# Patient Record
Sex: MALE | Race: WHITE | NOT HISPANIC OR LATINO | Employment: FULL TIME | URBAN - METROPOLITAN AREA
[De-identification: names, ages, dates, MRNs, and addresses within clinical notes are randomized per-mention and may not be internally consistent; named-entity substitution may affect disease eponyms.]

---

## 2018-03-08 ENCOUNTER — HISTORICAL (OUTPATIENT)
Dept: ADMINISTRATIVE | Facility: HOSPITAL | Age: 68
End: 2018-03-08

## 2018-04-19 ENCOUNTER — HISTORICAL (OUTPATIENT)
Dept: ADMINISTRATIVE | Facility: HOSPITAL | Age: 68
End: 2018-04-19

## 2019-04-07 ENCOUNTER — HOSPITAL ENCOUNTER (OUTPATIENT)
Dept: ONCOLOGY | Facility: HOSPITAL | Age: 69
End: 2019-04-09
Attending: COLON & RECTAL SURGERY | Admitting: COLON & RECTAL SURGERY

## 2019-04-07 LAB
ABS NEUT (OLG): 7.77 X10(3)/MCL (ref 2.1–9.2)
ALBUMIN SERPL-MCNC: 3.3 GM/DL (ref 3.4–5)
ALBUMIN/GLOB SERPL: 1 RATIO (ref 1.1–2)
ALP SERPL-CCNC: 79 UNIT/L (ref 50–136)
ALT SERPL-CCNC: 85 UNIT/L (ref 12–78)
AMPHET UR QL SCN: NEGATIVE
AMYLASE SERPL-CCNC: 58 UNIT/L (ref 25–115)
APPEARANCE, UA: CLEAR
APTT PPP: 25.8 SECOND(S) (ref 24.2–33.9)
AST SERPL-CCNC: 167 UNIT/L (ref 15–37)
BACTERIA SPEC CULT: NORMAL /HPF
BARBITURATE SCN PRESENT UR: NEGATIVE
BASOPHILS # BLD AUTO: 0.2 X10(3)/MCL (ref 0–0.2)
BASOPHILS NFR BLD AUTO: 1 %
BENZODIAZ UR QL SCN: NEGATIVE
BILIRUB SERPL-MCNC: 0.3 MG/DL (ref 0.2–1)
BILIRUB UR QL STRIP: NEGATIVE
BILIRUBIN DIRECT+TOT PNL SERPL-MCNC: 0.1 MG/DL (ref 0–0.5)
BILIRUBIN DIRECT+TOT PNL SERPL-MCNC: 0.2 MG/DL (ref 0–0.8)
BUN SERPL-MCNC: 21 MG/DL (ref 7–18)
CALCIUM SERPL-MCNC: 8.4 MG/DL (ref 8.5–10.1)
CANNABINOIDS UR QL SCN: NEGATIVE
CHLORIDE SERPL-SCNC: 104 MMOL/L (ref 98–107)
CO2 SERPL-SCNC: 27 MMOL/L (ref 21–32)
COCAINE UR QL SCN: NEGATIVE
COLOR UR: YELLOW
CREAT SERPL-MCNC: 1.26 MG/DL (ref 0.7–1.3)
CROSSMATCH INTERPRETATION: NORMAL
EOSINOPHIL # BLD AUTO: 0.4 X10(3)/MCL (ref 0–0.9)
EOSINOPHIL NFR BLD AUTO: 3 %
ERYTHROCYTE [DISTWIDTH] IN BLOOD BY AUTOMATED COUNT: 14.1 % (ref 11.5–17)
ETHANOL SERPL-MCNC: 5 MG/DL (ref 0–3)
GLOBULIN SER-MCNC: 3.3 GM/DL (ref 2.4–3.5)
GLUCOSE (UA): NEGATIVE
GLUCOSE SERPL-MCNC: 130 MG/DL (ref 74–106)
GROUP & RH: NORMAL
HCT VFR BLD AUTO: 39.2 % (ref 42–52)
HGB BLD-MCNC: 12.2 GM/DL (ref 14–18)
HGB UR QL STRIP: NEGATIVE
INR PPP: 1.2 (ref 0–1.3)
KETONES UR QL STRIP: NEGATIVE
LACTATE SERPL-SCNC: 1 MMOL/L (ref 0.4–2)
LACTATE SERPL-SCNC: 2.8 MMOL/L (ref 0.4–2)
LEUKOCYTE ESTERASE UR QL STRIP: NEGATIVE
LIPASE SERPL-CCNC: 125 UNIT/L (ref 73–393)
LYMPHOCYTES # BLD AUTO: 4.6 X10(3)/MCL (ref 0.6–4.6)
LYMPHOCYTES NFR BLD AUTO: 33 %
MCH RBC QN AUTO: 29 PG (ref 27–31)
MCHC RBC AUTO-ENTMCNC: 31.1 GM/DL (ref 33–36)
MCV RBC AUTO: 93.3 FL (ref 80–94)
MONOCYTES # BLD AUTO: 1 X10(3)/MCL (ref 0.1–1.3)
MONOCYTES NFR BLD AUTO: 7 %
NEUTROPHILS # BLD AUTO: 7.77 X10(3)/MCL (ref 2.1–9.2)
NEUTROPHILS NFR BLD AUTO: 56 %
NITRITE UR QL STRIP: NEGATIVE
OPIATES UR QL SCN: POSITIVE
PCP UR QL: NEGATIVE
PH UR STRIP.AUTO: 5 [PH] (ref 5–7.5)
PH UR STRIP: 5 [PH] (ref 5–9)
PLATELET # BLD AUTO: 307 X10(3)/MCL (ref 130–400)
PMV BLD AUTO: 10.2 FL (ref 9.4–12.4)
POC TROPONIN: 0 NG/ML (ref 0–0.08)
POTASSIUM SERPL-SCNC: 3.4 MMOL/L (ref 3.5–5.1)
PRODUCT READY: NORMAL
PROT SERPL-MCNC: 6.6 GM/DL (ref 6.4–8.2)
PROT UR QL STRIP: NEGATIVE
PROTHROMBIN TIME: 15.1 SECOND(S) (ref 12–14)
RBC # BLD AUTO: 4.2 X10(6)/MCL (ref 4.7–6.1)
RBC #/AREA URNS HPF: NORMAL /[HPF]
SODIUM SERPL-SCNC: 137 MMOL/L (ref 136–145)
SP GR FLD REFRACTOMETRY: 1.03 (ref 1–1.03)
SP GR UR STRIP: 1.03 (ref 1–1.03)
SQUAMOUS EPITHELIAL, UA: NORMAL
UROBILINOGEN UR STRIP-ACNC: 0.2
WBC # SPEC AUTO: 13.9 X10(3)/MCL (ref 4.5–11.5)
WBC #/AREA URNS HPF: NORMAL /HPF

## 2019-04-08 LAB
ABS NEUT (OLG): 7.49 X10(3)/MCL (ref 2.1–9.2)
ALBUMIN SERPL-MCNC: 2.9 GM/DL (ref 3.4–5)
ALBUMIN/GLOB SERPL: 1.1 RATIO (ref 1.1–2)
ALP SERPL-CCNC: 67 UNIT/L (ref 50–136)
ALT SERPL-CCNC: 66 UNIT/L (ref 12–78)
AST SERPL-CCNC: 85 UNIT/L (ref 15–37)
BASOPHILS # BLD AUTO: 0.1 X10(3)/MCL (ref 0–0.2)
BASOPHILS NFR BLD AUTO: 1 %
BILIRUB SERPL-MCNC: 0.7 MG/DL (ref 0.2–1)
BILIRUBIN DIRECT+TOT PNL SERPL-MCNC: 0.1 MG/DL (ref 0–0.5)
BILIRUBIN DIRECT+TOT PNL SERPL-MCNC: 0.6 MG/DL (ref 0–0.8)
BUN SERPL-MCNC: 16 MG/DL (ref 7–18)
CALCIUM SERPL-MCNC: 8.1 MG/DL (ref 8.5–10.1)
CHLORIDE SERPL-SCNC: 107 MMOL/L (ref 98–107)
CO2 SERPL-SCNC: 25 MMOL/L (ref 21–32)
CREAT SERPL-MCNC: 0.77 MG/DL (ref 0.7–1.3)
EOSINOPHIL # BLD AUTO: 0.1 X10(3)/MCL (ref 0–0.9)
EOSINOPHIL NFR BLD AUTO: 1 %
ERYTHROCYTE [DISTWIDTH] IN BLOOD BY AUTOMATED COUNT: 14.3 % (ref 11.5–17)
GLOBULIN SER-MCNC: 2.6 GM/DL (ref 2.4–3.5)
GLUCOSE SERPL-MCNC: 79 MG/DL (ref 74–106)
HCT VFR BLD AUTO: 37.3 % (ref 42–52)
HGB BLD-MCNC: 11.5 GM/DL (ref 14–18)
LYMPHOCYTES # BLD AUTO: 1.8 X10(3)/MCL (ref 0.6–4.6)
LYMPHOCYTES NFR BLD AUTO: 17 %
MCH RBC QN AUTO: 29 PG (ref 27–31)
MCHC RBC AUTO-ENTMCNC: 30.8 GM/DL (ref 33–36)
MCV RBC AUTO: 94 FL (ref 80–94)
MONOCYTES # BLD AUTO: 1.1 X10(3)/MCL (ref 0.1–1.3)
MONOCYTES NFR BLD AUTO: 10 %
NEUTROPHILS # BLD AUTO: 7.49 X10(3)/MCL (ref 2.1–9.2)
NEUTROPHILS NFR BLD AUTO: 71 %
PLATELET # BLD AUTO: 247 X10(3)/MCL (ref 130–400)
PMV BLD AUTO: 10.6 FL (ref 9.4–12.4)
POTASSIUM SERPL-SCNC: 4.6 MMOL/L (ref 3.5–5.1)
PROT SERPL-MCNC: 5.5 GM/DL (ref 6.4–8.2)
RBC # BLD AUTO: 3.97 X10(6)/MCL (ref 4.7–6.1)
SODIUM SERPL-SCNC: 138 MMOL/L (ref 136–145)
WBC # SPEC AUTO: 10.6 X10(3)/MCL (ref 4.5–11.5)

## 2019-04-09 LAB
ABS NEUT (OLG): 9.36 X10(3)/MCL (ref 2.1–9.2)
ALBUMIN SERPL-MCNC: 2.8 GM/DL (ref 3.4–5)
ALBUMIN/GLOB SERPL: 1.1 RATIO (ref 1.1–2)
ALP SERPL-CCNC: 81 UNIT/L (ref 50–136)
ALT SERPL-CCNC: 57 UNIT/L (ref 12–78)
AST SERPL-CCNC: 82 UNIT/L (ref 15–37)
BASOPHILS # BLD AUTO: 0.1 X10(3)/MCL (ref 0–0.2)
BASOPHILS NFR BLD AUTO: 0 %
BILIRUB SERPL-MCNC: 0.5 MG/DL (ref 0.2–1)
BILIRUBIN DIRECT+TOT PNL SERPL-MCNC: 0.2 MG/DL (ref 0–0.5)
BILIRUBIN DIRECT+TOT PNL SERPL-MCNC: 0.3 MG/DL (ref 0–0.8)
BUN SERPL-MCNC: 21 MG/DL (ref 7–18)
CALCIUM SERPL-MCNC: 8 MG/DL (ref 8.5–10.1)
CHLORIDE SERPL-SCNC: 108 MMOL/L (ref 98–107)
CO2 SERPL-SCNC: 28 MMOL/L (ref 21–32)
CREAT SERPL-MCNC: 0.98 MG/DL (ref 0.7–1.3)
EOSINOPHIL # BLD AUTO: 0.1 X10(3)/MCL (ref 0–0.9)
EOSINOPHIL NFR BLD AUTO: 1 %
ERYTHROCYTE [DISTWIDTH] IN BLOOD BY AUTOMATED COUNT: 14.5 % (ref 11.5–17)
GLOBULIN SER-MCNC: 2.6 GM/DL (ref 2.4–3.5)
GLUCOSE SERPL-MCNC: 112 MG/DL (ref 74–106)
HCT VFR BLD AUTO: 35.6 % (ref 42–52)
HGB BLD-MCNC: 10.9 GM/DL (ref 14–18)
LYMPHOCYTES # BLD AUTO: 2.3 X10(3)/MCL (ref 0.6–4.6)
LYMPHOCYTES NFR BLD AUTO: 17 %
MCH RBC QN AUTO: 28.8 PG (ref 27–31)
MCHC RBC AUTO-ENTMCNC: 30.6 GM/DL (ref 33–36)
MCV RBC AUTO: 94.2 FL (ref 80–94)
MONOCYTES # BLD AUTO: 1.2 X10(3)/MCL (ref 0.1–1.3)
MONOCYTES NFR BLD AUTO: 9 %
NEUTROPHILS # BLD AUTO: 9.36 X10(3)/MCL (ref 2.1–9.2)
NEUTROPHILS NFR BLD AUTO: 71 %
PLATELET # BLD AUTO: 230 X10(3)/MCL (ref 130–400)
PMV BLD AUTO: 10.2 FL (ref 9.4–12.4)
POTASSIUM SERPL-SCNC: 4.4 MMOL/L (ref 3.5–5.1)
PROT SERPL-MCNC: 5.4 GM/DL (ref 6.4–8.2)
RBC # BLD AUTO: 3.78 X10(6)/MCL (ref 4.7–6.1)
SODIUM SERPL-SCNC: 141 MMOL/L (ref 136–145)
WBC # SPEC AUTO: 13.1 X10(3)/MCL (ref 4.5–11.5)

## 2019-04-25 ENCOUNTER — HISTORICAL (OUTPATIENT)
Dept: ADMINISTRATIVE | Facility: HOSPITAL | Age: 69
End: 2019-04-25

## 2019-05-14 ENCOUNTER — HISTORICAL (OUTPATIENT)
Dept: ADMINISTRATIVE | Facility: HOSPITAL | Age: 69
End: 2019-05-14

## 2019-05-28 ENCOUNTER — HISTORICAL (OUTPATIENT)
Dept: ADMINISTRATIVE | Facility: HOSPITAL | Age: 69
End: 2019-05-28

## 2019-07-02 ENCOUNTER — HISTORICAL (OUTPATIENT)
Dept: ADMINISTRATIVE | Facility: HOSPITAL | Age: 69
End: 2019-07-02

## 2019-07-10 ENCOUNTER — HISTORICAL (OUTPATIENT)
Dept: ADMINISTRATIVE | Facility: HOSPITAL | Age: 69
End: 2019-07-10

## 2022-04-10 ENCOUNTER — HISTORICAL (OUTPATIENT)
Dept: ADMINISTRATIVE | Facility: HOSPITAL | Age: 72
End: 2022-04-10

## 2022-04-30 VITALS
WEIGHT: 185.19 LBS | BODY MASS INDEX: 22.53 KG/M2 | HEIGHT: 73 IN | WEIGHT: 185.19 LBS | SYSTOLIC BLOOD PRESSURE: 117 MMHG | WEIGHT: 170 LBS | HEIGHT: 73 IN | OXYGEN SATURATION: 97 % | BODY MASS INDEX: 24.54 KG/M2 | DIASTOLIC BLOOD PRESSURE: 73 MMHG | HEIGHT: 73 IN | BODY MASS INDEX: 24.54 KG/M2 | BODY MASS INDEX: 24.54 KG/M2 | DIASTOLIC BLOOD PRESSURE: 69 MMHG | BODY MASS INDEX: 24.54 KG/M2 | HEIGHT: 73 IN | DIASTOLIC BLOOD PRESSURE: 69 MMHG | WEIGHT: 185.19 LBS | SYSTOLIC BLOOD PRESSURE: 117 MMHG | DIASTOLIC BLOOD PRESSURE: 69 MMHG | SYSTOLIC BLOOD PRESSURE: 117 MMHG | SYSTOLIC BLOOD PRESSURE: 117 MMHG | HEIGHT: 73 IN | SYSTOLIC BLOOD PRESSURE: 123 MMHG | WEIGHT: 185.19 LBS | DIASTOLIC BLOOD PRESSURE: 69 MMHG

## 2022-04-30 NOTE — OP NOTE
Patient:   Randy Gutierrez            MRN: [id number]            FIN: [id number]               Age:   68 years     Sex:  Male     :  1950   Associated Diagnoses:   None   Author:   Domenic Monreal II, MD      Pre-op Dx:  Cataract of the Right eye    Post-op Dx:  Cataract of the Right eye     Procedure:  Cataract extraction by phacoemulsification   with an IOL    Anes:   Topical    Complications: None    Procedure in detail:   Dilating drops were given in the holding area.  The patient was brought into the surgical suite, identified and the correct eye confirmed.  Topical anesthesia was applied.  The eye was then prepped and draped in a sterile fashion.  A supersharp blade was used to make a paracentesis at the 11 oclock position.  Viscoelastic was placed in the anterior chamber.  A clear corneal incision was made at the 9 oclock position with a keratome blade.  Next, a cystotome and utrata forceps were used to make a 360 degree capsulorrhexis.  The phaco handpiece was placed into the anterior chamber and the lens removed in a divide and conquer fashion.  The remaining cortex was removed with the I & A handpiece.  Viscoelastic was placed into the capsular bag, which remained clear and intact.  An  IOL was placed in the bag and rotated into position.  The remaining viscoelastic was removed with the I &A handpiece.  The incision was hydrated with BSS and checked for leakage.  No leakage was found.  The drapes were removed and antibiotic drops were placed into the eye.  The patient tolerated the procedure well and was moved back to the holding room.  Sunglasses and instructions were personally given to the patient and family.  The patient will follow-up at my office tomorrow.     EFX 55    21.0    ZCBOO iol        Samuel Monreal II, M.D.

## 2022-04-30 NOTE — OP NOTE
Patient:   Randy Gutierrez            MRN: [id number]            FIN: [id number]               Age:   68 years     Sex:  Male     :  1950   Associated Diagnoses:   None   Author:   Domenic Monreal II, MD      Pre-op Dx:  Cataract of the Left eye    Post-op Dx:  Cataract of the Left eye     Procedure:  Cataract extraction by phacoemulsification   with an IOL    Anes:   Topical    Complications: None    Procedure in detail:   Dilating drops were given in the holding area.  The patient was brought into the surgical suite, identified and the correct eye confirmed.  Topical anesthesia was applied.  The eye was then prepped and draped in a sterile fashion.  A supersharp blade was used to make a paracentesis at the 5 oclock position.  Viscoelastic was placed in the anterior chamber.  A clear corneal incision was made at the   3 oclock position with a keratome blade.  Next, a cystatome and utrata forceps were used to make and remove a 360 degree capsulorrhexis.  The phaco handpiece was placed into the anterior chamber and the lens removed in a divide and conquer fashion.  The remaining cortex was removed with the I & A handpiece.  Viscoelastic was placed into the capsular bag, which remained clear and intact.  An  IOL was placed in the bag and rotated into position.  The remaining viscoelastic was removed with the I &A handpiece.  The incision was hydrated with BSS and checked for leakage.  No leakage was found.  The drapes were removed and antibiotic drops were placed into the eye.  The patient tolerated the procedure well and was moved back to the holding room.  Sunglasses and instructions were personally given to the patient and family.  The patient will follow-up at my office tomorrow.      EFX 50      21.5  ZCBOO IOL        Samuel Monreal II, M.D.

## 2022-04-30 NOTE — OP NOTE
DATE OF SURGERY:    04/08/2019    SURGEON:  Bradford Nunez MD  ASSISTANT:  Dougie Gonzales MD    PREOPERATIVE DIAGNOSIS:  Bilateral distal radius fractures.    POSTOPERATIVE DIAGNOSIS:  Bilateral distal radius fractures.    PROCEDURES:  Open reduction and internal fixation bilateral distal radius fractures, external fixation of the right distal radius    ASSISTANT:  Dougie Gonzales MD.  A certified assistant with skilled set of hands was necessary for proper position as well as assistance with closure.    ESTIMATED BLOOD LOSS:  30 cc.    IMPLANTS:  Include DVR Crosslock standard plate to the right as well as standard plate to the left, three 2.7 cortical screws, seven 2.7 locking screws.  The left had 2.7 cortical screws x4, 2.7 locking screws x6.  We also had a [employer] external fixator to the right wrist.    INDICATION FOR SURGERY:  The patient is a 69-year-old male who presented to the ER after a fall off a ladder.  The patient was noted to have bilateral distal radius fractures with significant comminution of the right fracture.  Left fracture was fairly nondisplaced, although intra-articular.  Risks, benefits and alternatives to operative intervention were discussed in detail with the patient.  All questions were answered.  No guarantees were made.  Despite these risks, he elected to proceed with surgical intervention.    PROCEDURE IN DETAIL:  The patient was identified in the preoperative holding area, marked and consented for surgery, taken to the operating room and placed under general endotracheal anesthesia.  Right arm was prepped and draped in normal sterile fashion.  Timeout was performed verifying correct patient, site, side and procedure.  Standard volar approach to the wrist was performed centered over the FCR tendon.  FCR tendon sheath was incised.  Tendon sheath was taken ulnarly.  FPL muscle fascia was incised as well and bluntly dissected through exposing the pronator quadratus.   The pronator quadratus was removed off the distal radius in a hockey-stick type fashion exposing the fracture.  The fracture fragment here was 3 parts with a very distal segment as well as a dorsal piece.  The fracture was reduced, pinned into place.  Appropriate placement of the plate was performed.  We placed 1 screw proximally to suck the plate down to the bone.  We then placed one screw distally, again to suck the distal fracture to the bone while holding the plate reduced.  We then were able to fill up the locking screws in the proximal row.  We checked these and all these were extra-articular.  We filled up the remaining locking screws in the distal row and all these were extra-articular.  We then put 2 lockers in the radial styloid portion.  Again, appropriate position was noted.  We then put 2 more screws in the shaft.  I examined the wrist given the dorsal fracture.  We did stress this wrist at rest.  The wrist remained anatomic, however, with any stress, the wrist did want to sublux dorsally.  Given this, we wanted to give some stability to the wrist to prevent any dorsal subluxation of the carpus.  Therefore, an ex fix was opened and utilized.  We made an incision overlying the radius.  Two pins were drilled into the radius.  These were bicortical in appropriate position.  We then drilled 2 pins into the 2nd metacarpal, again bicortical in appropriate position.  We attached the ex fix bar and clamped this into position with slight flexion and adduction of the wrist.  This was all tightened into position.  No further subluxation was noted and wrist reduction remained anatomic.  We then copiously irrigated.  We closed all wounds with 2-0 Vicryl and 3-0 nylon.  We then dressed this with a soft dressing.  We turned our attention towards preparation of the contralateral side.  The left wrist was then prepped in normal sterile fashion.  We did a similar approach with a volar approach to the wrist centered over  the FCR tendon.  Tendon sheath was incised.  Tendon was taken ulnarly.  The FPL tendon sheath was then incised, bluntly dissected to expose the pronator quadratus.  The pronator quadratus was taken off the distal radius in a hockey-stick type fashion exposing the fracture.  Fracture was flexed over and ulnarly deviated.  Reducing this anatomically, it was pinned in place.  We then placed the plate, adjusted it proximal distal as well as appropriate rotation, placed 1 screw proximally in the shaft to suck the plate down.  I again confirmed reduction with the pin.  We then placed a nonlocking screw proximally with excellent purchase.  We then filled up the proximal row as well as the distal row with locking screws.  We then placed our remaining radial styloid screws followed by 2 more screws in the shaft, totaling 3 cortical screws in the shaft.  All these screws were appropriate length.  All these screws were bicortical.  Fracture reduction remained anatomic.  Afterwards, we copiously irrigated with normal saline and dropped the tourniquet.  We irrigated, closed with 2-0 Vicryl and 3-0 nylon.  The patient was then placed in a volar resting splint.        ______________________________  MD STEFFEN Jane/FAM  DD:  04/08/2019  Time:  02:52PM  DT:  04/08/2019  Time:  03:48PM  Job #:  [id number]

## 2022-05-02 NOTE — HISTORICAL [facility] CERNER
This is a historical note converted from [employer]. Formatting and pictures may have been removed.  Please reference [employer] for original formatting and attached multimedia. Chief Complaint  3 WEEK F/U ORIF B/L RADIUS SX ON 4/8//9 GLOBAL 7/7/19 HERE TODAY FOR X-RAYS.  History of Present Illness  69-year-old male presents for follow-up with ORIF of left distal radius and ORIF and ex-fix of his right distal radius.? He is 4 weeks out. ?He is doing fairly well. ?Does have some tingling numbness to his right?thumb middle and long finger. ?This is improving.? Has been compliant with nonweightbearing restrictions.  Review of Systems  Denies fevers, chills, chest pain, shortness of breath. Comprehensive review of systems performed and otherwise negative except as noted in HPI.  Physical Exam  Vitals & Measurements  T:?98.0? ?F (Oral)? BP:?117/69?  HT:?185?cm? WT:?84?kg? BMI:?24.54?  General: No acute distress, alert and oriented, healthy appearing?  HEENT: Head is atraumatic, mucous membranes are moist  Neck: Supples, no JVD  Cardiovascular: Palpable dorsalis pedis and posterior tibial pulses, regular rate and rhythm to those pulses  Lungs: Breathing non-labored  Skin: no rashes appreciated  Neurologic: Can flex and extend knees, ankles, and toes. Sensation is grossly intact  ?  Right wrist:  Ex-fix in place. ?Incision clean and dry. ?Patient has near full flexion of his fingers. ?Does have decreased sensation in the median nerve distribution.  ?  Left wrist:  Incision clean and dry. ?Dorsiflexion 20 degrees. ?Palmar flexion 20 degrees. ?Full pronation and supination. ?No pain with range of motion.  Assessment/Plan  1.?Closed fracture of left distal radius?S52.502A  ?Patients fracture is healing well. ?Does have a gap in his lunate?and radial styloid fracture piece although there is bridging callus here.? Continue to be nonweightbearing. ?Continue to use his brace. ?We will see him back with repeat x-rays for this in 4  weeks.  Ordered:  Clinic Follow up, *Est. 05/28/19 3:00:00 CDT, Order for future visit, Closed fracture of left distal radius  Fracture of right distal radius, [facility]  Post-Op follow-up visit [id number] PC, Closed fracture of left distal radius  Fracture of right distal radius, [facility], 05/14/19 12:07:00 CDT  ?  2.?Fracture of right distal radius?S52.501A  ?Patients right distal radius fracture is healing well. ?Also remove his ex-fix today. ?He like to wait 2 more weeks. ?Think this is appropriate. ?We will see him back in 2 weeks?plan to remove his ex-fix. ?Repeat x-ray of his right wrist only in 2 weeks.  Ordered:  Clinic Follow up, *Est. 05/28/19 3:00:00 CDT, Order for future visit, Closed fracture of left distal radius  Fracture of right distal radius, [facility]  Post-Op follow-up visit [id number] PC, Closed fracture of left distal radius  Fracture of right distal radius, [facility], 05/14/19 12:07:00 CDT  XR Wrist Right Minimum 3 Views, Routine, *Est. 05/28/19 3:00:00 CDT, Fracture, None, Stretcher, Patient Has IV?, Rad Type, Order for future visit, Fracture of right distal radius, Not Scheduled, *Est. 05/28/19 3:00:00 CDT  ?  Referrals  Clinic Follow up, *Est. 05/28/19 3:00:00 CDT, Order for future visit, Closed fracture of left distal radius  Fracture of right distal radius, [facility]   Problem List/Past Medical History  Ongoing  Closed displaced fracture of styloid process of left radius  Closed fracture of left distal radius  Heart attack  HLD (hyperlipidemia)  Tobacco user  Historical  Stent placement  Procedure/Surgical History  Application of a uniplane (pins or wires in 1 plane), unilateral, external fixation system (04/08/2019)  Insertion of External Fixation Device into Right Radius, Open Approach (04/08/2019)  Open treatment of distal radial intra-articular fracture or epiphyseal separation; with internal fixation of 2 fragments (04/08/2019)  ORIF Radius Distal  (Bilateral) (04/08/2019)  Reposition Left Radius with Internal Fixation Device, Open Approach (04/08/2019)  Reposition Right Radius with Internal Fixation Device, Open Approach (04/08/2019)  26931 - RT CATARACT W/IOL 1 STA PHACO (Right) (04/19/2018)  Extracapsular cataract removal with insertion of intraocular lens prosthesis (1 stage procedure), manual or mechanical technique (eg, irrigation and aspiration or phacoemulsification) (04/19/2018)  Replacement of Right Lens with Synthetic Substitute, Percutaneous Approach (04/19/2018)  51528 - LT CATARACT W/IOL 1 STA PHACO (Left) (03/08/2018)  Extracapsular cataract removal with insertion of intraocular lens prosthesis (1 stage procedure), manual or mechanical technique (eg, irrigation and aspiration or phacoemulsification) (03/08/2018)  Replacement of Left Lens with Synthetic Substitute, Percutaneous Approach (03/08/2018)  Angiogram  Colonoscopy  History of tonsillectomy  Stent   Medications  Aspir-Low 81 mg oral delayed release tablet, 81 mg= 1 tab(s), Oral, Daily  aspirin 81 mg oral tablet, 81 mg= 1 tab(s), Oral, BID,? ?Not taking: Last Dose Date/Time Unknown  Chantix Continuing Month 1 mg oral tablet, 1 mg= 1 tab(s), Oral, BID  Chantix Starter Pack 0.5 mg-1 mg oral tablet  cyclobenzaprine 10 mg oral tablet, 10 mg= 1 tab(s), Oral, TID  meloxicam 15 mg oral tablet, 15 mg= 1 tab(s), Oral, Daily  methylPREDNISolone 4 mg oral tab  Neurontin 300 mg oral capsule, 600 mg= 2 cap(s), Oral, Once a day (at bedtime)  Neurontin 300 mg oral capsule, 300 mg= 1 cap(s), Oral, BID,? ?Not taking: Last Dose Date/Time Unknown  Norco 7.5 mg-325 mg oral tablet, 1 tab(s), Oral, q6hr, PRN  oxycodone 5 mg oral tablet  Pantoprazole 40 mg ORAL EC-Tablet, 40 mg= 1 tab(s), Oral, Daily  sulfamethoxazole-trimethoprim 800 mg-160 mg oral tablet, 1 tab(s), Oral, BID  Allergies  No Known Allergies  No Known Medication Allergies  Social History  Alcohol  Current, Beer, 1-2 times per month,  04/17/2019  Current, 1-2 times per month, Alcohol use interferes with work or home: No. Drinks more than intended: No. Others hurt by drinking: No. Ready to change: No. Household alcohol concerns: No., 04/08/2019  Substance Abuse  Never, 04/17/2019  Tobacco  Current every day smoker, 08 per day. 30 year(s)., 04/17/2019  Family History  Leukemia: Father.  Health Maintenance  Health Maintenance  ???Pending?(in the next year)  ??? ??OverDue  ??? ? ? ?Coronary Artery Disease Maintenance-Antiplatelet Agent Prescribed due??and every?  ??? ? ? ?Advance Directive due??01/01/19??and every 1??year(s)  ??? ? ? ?Alcohol Misuse Screening due??01/01/19??and every 1??year(s)  ??? ? ? ?Cognitive Screening due??01/01/19??and every 1??year(s)  ??? ? ? ?Functional Assessment due??01/01/19??and every 1??year(s)  ??? ? ? ?Geriatric Depression Screening due??01/01/19??and every 1??year(s)  ??? ? ? ?Smoking Cessation due??01/01/19??and every 1??year(s)  ??? ??Due?  ??? ? ? ?ADL Screening due??05/14/19??and every 1??year(s)  ??? ? ? ?Abdominal Aortic Aneurysm Screening due??05/14/19??and every 100??year(s)  ??? ? ? ?Colorectal Screening (Senior Wellness) due??05/14/19??and every 10??year(s)  ??? ? ? ?Coronary Artery Disease Maintenance-Lipid Lowering Therapy due??05/14/19??and every?  ??? ? ? ?Lung Cancer Screening due??05/14/19??and every 1??year(s)  ??? ? ? ?Pneumococcal Vaccine due??05/14/19??Variable frequency  ??? ? ? ?Pneumococcal Vaccine due??05/14/19??and every?  ??? ? ? ?Tetanus Vaccine due??05/14/19??and every 10??year(s)  ??? ? ? ?Zoster Vaccine due??05/14/19??and every 100??year(s)  ??? ??Due In Future?  ??? ? ? ?Fall Risk Assessment not due until??01/01/20??and every 1??year(s)  ??? ? ? ?Obesity Screening not due until??01/01/20??and every 1??year(s)  ??? ? ? ?Aspirin Therapy for CVD Prevention not due until??04/09/20??and every 1??year(s)  ???Satisfied?(in the past 1 year)  ??? ??Satisfied?  ??? ? ? ?Aspirin Therapy for CVD  Prevention on??04/09/19.??Satisfied by Severino CHE, Gladys  ??? ? ? ?Blood Pressure Screening on??05/14/19.??Satisfied by Luana Kim  ??? ? ? ?Body Mass Index Check on??05/14/19.??Satisfied by Luana Kim  ??? ? ? ?Coronary Artery Disease Maintenance-Antiplatelet Agent Prescribed on??04/07/19.  ??? ? ? ?Depression Screening on??04/25/19.??Satisfied by Temi Watson  ??? ? ? ?Diabetes Screening on??04/09/19.??Satisfied by Virginia Parks  ??? ? ? ?Fall Risk Assessment on??04/08/19.??Satisfied by Jovanny CHE, Tierra TUBBS  ??? ? ? ?Obesity Screening on??05/14/19.??Satisfied by Luana Kim  ?  ?  Diagnostic Results  AP and lateral bilateral wrist reviewed. ?Patients fractures to have bridging callus. ?Is a slight gapping of the left lunate facet?although does appear to be healing. ?No displacement of the right wrist.

## 2022-05-02 NOTE — HISTORICAL [facility] CERNER
This is a historical note converted from [employer]. Formatting and pictures may have been removed.  Please reference [employer] for original formatting and attached multimedia. Admit and Discharge Dates  Admit Date: 04/07/2019  Discharge Date: 04/09/2019  ?  Physicians  Attending Physician - Lonny THURSTON, Franc LORENZO.  Admitting Physician - Lonny THURSTON, Franc LORENZO.  Consulting Physician - Enrique THURSTON, Bradford SAMUEL  Primary Care Physician - Ryan THURSTON , Nick SAMUEL  ?  Discharge Diagnosis  1.?Displaced fracture of left radial styloid process, initial encounter for closed fracture?S52.512A  Closed head injury?S09.90XA  Contusion of adrenal gland, initial encounter?S37.812A  Emphysema lung?J43.9  Fall?[id number]  Fracture of right ulnar styloid?S52.611A  Hypoxia?R09.02  Other intraarticular fracture of lower end of right radius, initial encounter for closed fracture?S52.571A  Transient hypotension?I95.9  Surgical Procedures  04/08/2019 - [id number] - ORIF Radius Distal  ?  Immunizations  No immunizations recorded for this visit.  ?  Admission Information  Randy Gutierrez?is a?69 Years?old?Male that presented as a level 1 trauma following a fall from a ladder about 12 feet up. He?was alert and oriented on arrival. He denied LOC. He states he was reaching out trying to trim some branches when he lost his balance and fell. He landed on his hands to brace his fall. When EMS was on scene, he was sitting up in a chair. His initial blood pressure was found to be 81 systolic so a level 1 was activated. On arrival, his BP was in the 100s systolic. GCS was 15. He noted he had pain in his bilateral wrists, worse on his right. He also had lower back pain, but it was the same as the chronic back pain he always has. He denies chest pain, shortness of breath, abdominal pain, or other pain. His BP responded to fluids and was 120s systolic. He was found to have left distal radial fractures and right distal radius and ulnar  styloid fractures. CT head and c-spine were negative. CT abdomen showed fat stranding around the right adrenal gland. Pelvic and chest XR showed no acute injuries. He was kept overnight for observation of his blood pressure, which remained stable. He was taken the following day with orthopedic surgery to the OR for ORIF bilateral distal radius and ex fix of right distal radius. He tolerated the procedure well and was eating without issues last night. Today he states he was some pain in his right wrist and his right index finger is numb. This is likely related to swelling from the surgery the prior day.?He is moving all fingers. He is tolerated PO and his pain in controlled. He was seen by ortho and deemed appropriate for discharge from their standpoint with follow up in 2 weeks. He worked with PT/OT this morning. He will be given pain medications and follow up with ortho. He will get outpatient therapy  Objective  Vitals & Measurements  T:?36.7? ?C (Oral)? TMIN:?36.4? ?C (Oral)? TMAX:?36.7? ?C (Oral)? HR:?71(Peripheral)? RR:?18? BP:?117/69? SpO2:?95%?  Physical Exam  Gen: NAD  Neuro: no focal deficits  HEENT: abrasion to right supraorbital region healing appropriately  Cardio: RR  Resp: non-labored breathing  Abd: soft, NT, ND  Ext: bilateral upper extremities splinted, ex fix in place in right forearm, brisk cap refill, wiggles fingers, numbness along right fingers, mainly index  Patient Discharge Condition  stable  Discharge Disposition  home   Discharge Medication Reconciliation  Prescribed  cyclobenzaprine (cyclobenzaprine 10 mg oral tablet)?10 mg, Oral, TID  gabapentin (Neurontin 300 mg oral capsule)?300 mg, Oral, BID  oxyCODONE (oxycodone 5 mg oral tablet)?10 mg, Oral, q4hr, PRN pain, moderate  Continue  aspirin (Aspir-Low 81 mg oral delayed release tablet)?81 mg, Oral, Daily  ?      f/u with ortho.? stable for d/c

## 2022-05-02 NOTE — HISTORICAL [facility]
This is a historical note converted from Dwight. Formatting and pictures may have been removed.  Please reference Dwight for original formatting and attached multimedia. Chief Complaint  BILATERAL WRIST FX. ORIF ON 4/8/19. ?FELL OFF THE LADDER ON 4/7/19.  History of Present Illness  69-year-old male presents today for?follow-up bilateral?distal radius fractures. ?Both treated operatively. ?Patient is doing fairly well. ?Complaining of some tingling to his right hand although this is improving.? No issues with his left wrist.  Review of Systems  Denies fevers, chills, chest pain, shortness of breath. Comprehensive review of systems performed and otherwise negative except as noted in HPI.  Physical Exam  Vitals & Measurements  BP:?117/69?  HT:?185?cm? WT:?84?kg? BMI:?24.54?  General: No acute distress, alert and oriented, healthy appearing?  HEENT: Head is atraumatic, mucous membranes are moist  Neck: Supples, no JVD  Cardiovascular: Palpable dorsalis pedis and posterior tibial pulses, regular rate and rhythm to those pulses  Lungs: Breathing non-labored  Skin: no rashes appreciated  Neurologic: Can flex and extend knees, ankles, and toes. Sensation is grossly intact  ?  Bilateral wrist:  Sensation is intact to the right hand including median, radial, ulnar distributions although slightly decreased compared to the left  Full range of motion of the left?fingers. ?Right finger somewhat limited given swelling. ?Incisions are all clean and dry.  Assessment/Plan  1.?Closed fracture of left distal radius?S52.502A  ?Patients fractures are healing well. ?We will discontinue the sutures in both of his wrist. ?Placed in a brace on his left wrist.? Patient may begin range of motion of left wrist he was instructed on this today we will see him back in 3 weeks with an x-ray. ?Continue nonweightbearing.  Ordered:  Clinic Follow up, *Est. 05/16/19 3:00:00 CDT, Order for future visit, Closed fracture of left distal radius  Closed  displaced fracture of styloid process of left radius  [facility]  Post-Op follow-up visit [id number] PC, Closed fracture of left distal radius  Closed displaced fracture of styloid process of left radius  Fracture of right distal radius, [facility], 04/25/19 9:22:00 CDT  XR Wrist Left Minimum 3 Views, Routine, 04/25/19 8:44:00 CDT, Fracture, None, Stretcher, Patient Has IV?, Rad Type, Closed fracture of left distal radius, Not Scheduled, 04/25/19 8:44:00 CDT  XR Wrist Left Minimum 3 Views, Routine, *Est. 05/16/19 3:00:00 CDT, Fracture, None, Stretcher, Patient Has IV?, Rad Type, Order for future visit, Closed fracture of left distal radius  Closed displaced fracture of styloid process of left radius  Fracture of right distal radius, N...  XR Wrist Right Minimum 3 Views, Routine, *Est. 05/16/19 3:00:00 CDT, Fracture, None, Stretcher, Patient Has IV?, Rad Type, Order for future visit, Closed fracture of left distal radius  Closed displaced fracture of styloid process of left radius  Fracture of right distal radius, N...  ?  2.?Closed displaced fracture of styloid process of left radius?S52.512A  Ordered:  Clinic Follow up, *Est. 05/16/19 3:00:00 CDT, Order for future visit, Closed fracture of left distal radius  Closed displaced fracture of styloid process of left radius  Fracture of right distal radius, [facility]  Post-Op follow-up visit [id number] PC, Closed fracture of left distal radius  Closed displaced fracture of styloid process of left radius  Fracture of right distal radius, [facility], 04/25/19 9:22:00 CDT  XR Wrist Left Minimum 3 Views, Routine, *Est. 05/16/19 3:00:00 CDT, Fracture, None, Stretcher, Patient Has IV?, Rad Type, Order for future visit, Closed fracture of left distal radius  Closed displaced fracture of styloid process of left radius  Fracture of right distal radius, N...  XR Wrist Right Minimum 3 Views, Routine, *Est. 05/16/19  3:00:00 CDT, Fracture, None, Stretcher, Patient Has IV?, Rad Type, Order for future visit, Closed fracture of left distal radius  Closed displaced fracture of styloid process of left radius  Fracture of right distal radius, N...  ?  3.?Fracture of right distal radius?S52.501A  ?Patient with comminuted right distal radius fracture.? We will plan to discontinue sutures today. ?Continue nonweightbearing.? Plan to remove his ex-fix the next 3 to 4 weeks?once callus has developed.  Ordered:  Clinic Follow up, *Est. 05/16/19 3:00:00 CDT, Order for future visit, Closed fracture of left distal radius  Closed displaced fracture of styloid process of left radius  Fracture of right distal radius, [facility]  Post-Op follow-up visit [id number] PC, Closed fracture of left distal radius  Closed displaced fracture of styloid process of left radius  [facility]racture of right distal radius, [facility], 04/25/19 9:22:00 CDT  XR Wrist Left Minimum 3 Views, Routine, *Est. 05/16/19 3:00:00 CDT, Fracture, None, Stretcher, Patient Has IV?, Rad Type, Order for future visit, Closed fracture of left distal radius  Closed displaced fracture of styloid process of left radius  Fracture of right distal radius, N...  XR Wrist Right Minimum 3 Views, Routine, *Est. 05/16/19 3:00:00 CDT, Fracture, None, Stretcher, Patient Has IV?, Rad Type, Order for future visit, Closed fracture of left distal radius  Closed displaced fracture of styloid process of left radius  Fracture of right distal radius, N...  ?  Orders:  XR Wrist Right Minimum 3 Views, Routine, 04/25/19 8:44:00 CDT, Fracture, None, Stretcher, Patient Has IV?, Rad Type, Right wrist fracture, Not Scheduled, 04/25/19 8:44:00 CDT   Problem List/Past Medical History  Ongoing  Closed displaced fracture of styloid process of left radius  Closed fracture of left distal radius  Heart attack  HLD (hyperlipidemia)  Tobacco user  Historical  Stent placement  Procedure/Surgical  History  Application of a uniplane (pins or wires in 1 plane), unilateral, external fixation system (04/08/2019)  Insertion of External Fixation Device into Right Radius, Open Approach (04/08/2019)  Open treatment of distal radial intra-articular fracture or epiphyseal separation; with internal fixation of 2 fragments (04/08/2019)  ORIF Radius Distal (Bilateral) (04/08/2019)  Reposition Left Radius with Internal Fixation Device, Open Approach (04/08/2019)  Reposition Right Radius with Internal Fixation Device, Open Approach (04/08/2019)  49244 - RT CATARACT W/IOL 1 STA PHACO (Right) (04/19/2018)  Extracapsular cataract removal with insertion of intraocular lens prosthesis (1 stage procedure), manual or mechanical technique (eg, irrigation and aspiration or phacoemulsification) (04/19/2018)  Replacement of Right Lens with Synthetic Substitute, Percutaneous Approach (04/19/2018)  66504 - LT CATARACT W/IOL 1 STA PHACO (Left) (03/08/2018)  Extracapsular cataract removal with insertion of intraocular lens prosthesis (1 stage procedure), manual or mechanical technique (eg, irrigation and aspiration or phacoemulsification) (03/08/2018)  Replacement of Left Lens with Synthetic Substitute, Percutaneous Approach (03/08/2018)  Angiogram  Colonoscopy  History of tonsillectomy  Stent   Medications  Aspir-Low 81 mg oral delayed release tablet, 81 mg= 1 tab(s), Oral, Daily  aspirin 81 mg oral tablet, 81 mg= 1 tab(s), Oral, BID,? ?Not taking  Neurontin 300 mg oral capsule, 300 mg= 1 cap(s), Oral, BID,? ?Not taking  oxycodone 5 mg oral tablet  Allergies  No Known Allergies  No Known Medication Allergies  Social History  Alcohol  Current, Beer, 1-2 times per month, 04/17/2019  Current, 1-2 times per month, Alcohol use interferes with work or home: No. Drinks more than intended: No. Others hurt by drinking: No. Ready to change: No. Household alcohol concerns: No., 04/08/2019  Substance Abuse  Never, 04/17/2019  Tobacco  10 or more  cigarettes (1/2 pack or more)/day in last 30 days, No, 04/25/2019  Current every day smoker, 08 per day. 30 year(s)., 04/17/2019  10 or more cigarettes (1/2 pack or more)/day in last 30 days, No, 04/08/2019  Family History  Leukemia: Father.  Health Maintenance  Health Maintenance  ???Pending?(in the next year)  ??? ??OverDue  ??? ? ? ?Coronary Artery Disease Maintenance-Antiplatelet Agent Prescribed due??and every?  ??? ? ? ?Advance Directive due??04/16/19??and every 1??year(s)  ??? ??Due?  ??? ? ? ?ADL Screening due??04/25/19??and every 1??year(s)  ??? ? ? ?Abdominal Aortic Aneurysm Screening due??04/25/19??and every 100??year(s)  ??? ? ? ?Alcohol Misuse Screening due??04/25/19??and every 1??year(s)  ??? ? ? ?Cognitive Screening due??04/25/19??and every 1??year(s)  ??? ? ? ?Colorectal Screening (Sen[facility] Wellness) due??04/25/19??and every 10??year(s)  ??? ? ? ?Coronary Artery Disease Maintenance-Lipid Lowering Therapy due??04/25/19??and every?  ??? ? ? ?Functional Assessment due??04/25/19??and every 1??year(s)  ??? ? ? ?Geriatric Depression Screening due??04/25/19??and every 1??year(s)  ??? ? ? ?Lung Cancer Screening due??04/25/19??and every 1??year(s)  ??? ? ? ?Pneumococcal Vaccine due??04/25/19??Variable frequency  ??? ? ? ?Pneumococcal Vaccine due??04/25/19??and every?  ??? ? ? ?Tetanus Vaccine due??04/25/19??and every 10??year(s)  ??? ? ? ?Zoster Vaccine due??04/25/19??and every 100??year(s)  ??? ??Due In Future?  ??? ? ? ?Smoking Cessation not due until??05/01/19??and every 1??year(s)  ??? ? ? ?Fall Risk Assessment not due until??04/08/20??and every 1??year(s)  ??? ? ? ?Aspirin Therapy for CVD Prevention not due until??04/09/20??and every 1??year(s)  ???Satisfied?(in the past 1 year)  ??? ??Satisfied?  ??? ? ? ?Aspirin Therapy for CVD Prevention on??04/09/19.??Satisfied by Gladys Haq RN  ??? ? ? ?Blood Pressure Screening on??04/25/19.??Satisfied by Temi Watson  ??? ? ? ?Body Mass Index Check  on??04/25/19.??Satisfied by Temi Watson  ??? ? ? ?Coronary Artery Disease Maintenance-Antiplatelet Agent Prescribed on??04/07/19.  ??? ? ? ?Depression Screening on??04/25/19.??Satisfied by Temi Watson  ??? ? ? ?Diabetes Screening on??04/09/19.??Satisfied by Virginia Parks  ??? ? ? ?Fall Risk Assessment on??04/08/19.??Satisfied by Tierra Petty RN  ??? ? ? ?Obesity Screening on??04/25/19.??Satisfied by Temi Watson  ??? ? ? ?Smoking Cessation on??05/01/18.??Satisfied by Janine Julio LPN  ??? ? ? ?Smoking Cessation (Coronary Artery Disease) on??05/01/18.??Satisfied by Janine Julio LPN  ??? ? ? ?Smoking Cessation (Diabetes) on??05/01/18.??Satisfied by Janine Julio LPN  ?  ?  Diagnostic Results  AP lateral bilateral wrist reviewed. ?Patients?fracture is well aligned. ?Hardware in good position. ?No signs of loosening or loss of reduction.

## 2022-05-02 NOTE — HISTORICAL [facility]
This is a historical note converted from Dwight. Formatting and pictures may have been removed.  Please reference Dwight for original formatting and attached multimedia. Chief Complaint  Patient is trauma 1 activation from fall off of 10-12 ft ladder, B/P ?81/50 no thinners  Reason for Consultation  Bilateral distal radius fractures  History of Present Illness  69-year-old male presents to the hospital after a fall from a ladder. ?Patient states he was 10-12 feet up in the air trimming some branches when he lost his balance and fell. ?Landed on his bilateral arms.? Orthopedics has been consulted after x-rays revealed bilateral distal radius fractures. ?Patient complains of right greater than left wrist pain. ?Is worse with any motion. ?Is improved slightly by rest. ?Denies any issues with his lower back?or legs.? Pain is sharp in nature.? It is moderate in severity at rest.? Denies any radiculopathy. ?Denies any tingling numbness currently. ?Patient did note a loss of consciousness  Review of Systems  Denies fevers, chills, chest pain, shortness of breath. Comprehensive review of systems performed and otherwise negative except as noted in HPI.  Physical Exam  Vitals & Measurements  HR:?75(Peripheral)? HR:?75(Monitored)? RR:?17? BP:?103/61? SpO2:?95%?  General: No acute distress, alert and oriented, healthy appearing?  HEENT: Head is atraumatic, mucous membranes are moist  Neck: Supples, no JVD  Cardiovascular: Palpable dorsalis pedis and posterior tibial pulses, regular rate and rhythm to those pulses  Lungs: Breathing non-labored  Skin: no rashes appreciated  Neurologic: Can flex and extend knees, ankles, and toes. Sensation is grossly intact  ?  Bilateral upper extremities:  Patient is tender over bilateral distal radii. ?Right does show some deformity. ?Left is fairly nondisplaced?and otherwise has no deformity. ?Full range of motion not done given underlying fracture. ?Patient noted?to have neurovascularly  intact exam to bilateral hands. ?Sensation intact median radial and ulnar distributions. ?Brisk capillary refill. ?He can range his fingers. ?There is moderate swelling to both of his wrists.? Brisk capillary refill to both of his fingers.  Assessment/Plan  1.?Displaced fracture of left radial styloid process, initial encounter for closed fracture  ?Patient with displaced bilateral intra-articular distal radius fractures. ?Discussed all treatment options the patient. ?The right wrist is fairly displaced with short segments distally. ?Left wrist is fairly nondisplaced. ?We discussed all treatment options. ?Given the intra-articular nature of the fracture as well as displacement, would recommend operative fixation with open reduction to fixation of bilateral distal radius?versus external fixation. ?The risk, benefits, and alternatives?to ORIF?versus ex-fix to bilateral wrist were discussed in detail. ?All questions were answered today. ?No guarantees made. ?Despite these risks the patient like proceed surgical attention.? We will plan for ORIF of both of his wrists tomorrow.  ?  Patient will be placed in a well-padded sugar tong splints to both of his wrists. ?Nonweightbearing bilateral upper extremities.? CT scan of the right and left wrist are pending currently?to evaluate?fragments and deformity.  Orders:  CT Extremity Upper Left W/O Contrast, Stat, 04/07/19 13:33:00 CDT, Extremity mass or swelling, None, Stretcher, Rad Type, Schedule this test, 04/07/19 13:33:00 CDT  CT Extremity Upper Right W/O Contrast, Stat, 04/07/19 13:33:00 CDT, Extremity mass or swelling, None, Stretcher, Rad Type, Schedule this test, 04/07/19 13:33:00 CDT   Problem List/Past Medical History  Ongoing  No qualifying data  Historical  No qualifying data  Medications  Inpatient  No active inpatient medications  Home  No active home medications  Allergies  No active allergies  Lab Results  Labs Last 24 Hours?  ?Chemistry? Hematology/Coagulation?    Sodium Lvl: 137 mmol/L (04/07/19 11:36:00) PT:?15.1 second(s)?High (04/07/19 11:36:00)   Potassium Lvl:?3.4 mmol/L?Low (04/07/19 11:36:00) INR: 1.2 (04/07/19 11:36:00)   Chloride: 104 mmol/L (04/07/19 11:36:00) PTT: 25.8 second(s) (04/07/19 11:36:00)   CO2: 27 mmol/L (04/07/19 11:36:00) WBC:?13.9 x10(3)/mcL?High (04/07/19 11:36:00)   Calcium Lvl:?8.4 mg/dL?Low (04/07/19 11:36:00) RBC:?4.2 x10(6)/mcL?Low (04/07/19 11:36:00)   Glucose Lvl:?130 mg/dL?High (04/07/19 11:36:00) Hgb:?12.2 gm/dL?Low (04/07/19 11:36:00)   BUN:?21 mg/dL?High (04/07/19 11:36:00) Hct:?39.2 %?Low (04/07/19 11:36:00)   Creatinine: 1.26 mg/dL (04/07/19 11:36:00) Platelet: 307 x10(3)/mcL (04/07/19 11:36:00)   eGFR-AA: >60 (04/07/19 11:36:00) MCV: 93.3 fL (04/07/19 11:36:00)   eGFR-REGINA: 54 mL/min/1.73 m2 (04/07/19 11:36:00) MCH: 29 pg (04/07/19 11:36:00)   Amylase Lvl: 58 unit/L (04/07/19 11:36:00) MCHC:?31.1 gm/dL?Low (04/07/19 11:36:00)   Bili Total: 0.3 mg/dL (04/07/19 11:36:00) RDW: 14.1 % (04/07/19 11:36:00)   Bili Direct: 0.1 mg/dL (04/07/19 11:36:00) MPV: 10.2 fL (04/07/19 11:36:00)   Bili Indirect: 0.2 mg/dL (04/07/19 11:36:00) Abs Neut: 7.77 x10(3)/mcL (04/07/19 11:36:00)   AST:?167 unit/L?High (04/07/19 11:36:00) Neutro Auto: 56 % (04/07/19 11:36:00)   ALT:?85 unit/L?High (04/07/19 11:36:00) Lymph Auto: 33 % (04/07/19 11:36:00)   Alk Phos: 79 unit/L (04/07/19 11:36:00) Mono Auto: 7 % (04/07/19 11:36:00)   Total Protein: 6.6 gm/dL (04/07/19 11:36:00) Eos Auto: 3 % (04/07/19 11:36:00)   Albumin Lvl:?3.3 gm/dL?Low (04/07/19 11:36:00) Abs Eos: 0.4 x10(3)/mcL (04/07/19 11:36:00)   Globulin: 3.3 gm/dL (04/07/19 11:36:00) Basophil Auto: 1 % (04/07/19 11:36:00)   A/G Ratio:?1 ratio?Low (04/07/19 11:36:00) Abs Neutro: 7.77 x10(3)/mcL (04/07/19 11:36:00)   Lactic Acid Lvl:?2.8 mmol/L?Critical (04/07/19 11:36:00) Abs Lymph: 4.6 x10(3)/mcL (04/07/19 11:36:00)   Lipase Lvl: 125 unit/L (04/07/19 11:36:00) Abs Mono: 1 x10(3)/mcL (04/07/19 11:36:00)    POC Troponin: 0 ng/mL (04/07/19 11:54:00) Abs Baso: 0.2 x10(3)/mcL (04/07/19 11:36:00)   ?  ?  Diagnostic Results  AP lateral bilateral wrist reviewed. ?Patient with intra-articular fractures of bilateral wrists. ?Right is significantly displaced. ?Left is fairly nondisplaced although does extend intra-articularly.

## 2022-05-02 NOTE — HISTORICAL [facility]
This is a historical note converted from Dwight. Formatting and pictures may have been removed.  Please reference Dwight for original formatting and attached multimedia. Chief Complaint  ORIF BILATERAL RADIUS SX ON 4/8/19. ?XRAYING RIGHT TODAY.  History of Present Illness  69-year-old male presents today for follow-up after ORIF of bilateral wrist with x-rays of the right wrist. ?He is now 6 weeks out. ?Is here today for acute removal of his right side he has no complaints of pain in the left wrist.? Doing well with the right wrist as well.  Review of Systems  Denies fevers, chills, chest pain, shortness of breath. Comprehensive review of systems performed and otherwise negative except as noted in HPI.  Physical Exam  Vitals & Measurements  BP:?117/69?  HT:?185?cm? WT:?84?kg? BMI:?24.54?  General: No acute distress, alert and oriented, healthy appearing?  HEENT: Head is atraumatic, mucous membranes are moist  Neck: Supples, no JVD  Cardiovascular: Palpable dorsalis pedis and posterior tibial pulses, regular rate and rhythm to those pulses  Lungs: Breathing non-labored  Skin: no rashes appreciated  Neurologic: Can flex and extend knees, ankles, and toes. Sensation is grossly intact  ?  Bilateral wrist:  Incisions are all well-healed and clean and dry.? Patient has dorsiflexion of the left wrist?to 10 degrees. ?Palmar flexion to 25 degrees. ?No significant pain with range of motion.? Right wrist x-ray site is clean and dry and intact.  Assessment/Plan  1.?Fracture of right distal radius?S52.501A  ?Plan remove the right ex-fix today. ?Patient begin gentle range of motion of the right wrist.? Continue nonweightbearing?until follow-up in 4 weeks.  Ordered:  Clinic Follow up, *Est. 06/25/19 3:00:00 CDT, Order for future visit, Fracture of right distal radius  Left wrist fracture, [facility]  Post-Op follow-up visit [id number] PC, Fracture of right distal radius  Left wrist fracture, [facility], 05/28/19  13:39:00 CDT  XR Wrist Left Minimum 3 Views, Routine, *Est. 06/25/19 3:00:00 CDT, Fracture, None, Stretcher, Patient Has IV?, Rad Type, Order for future visit, Fracture of right distal radius  Left wrist fracture, Not Scheduled, *Est. 06/25/19 3:00:00 CDT  XR Wrist Right Minimum 3 Views, Routine, *Est. 06/25/19 3:00:00 CDT, Fracture, None, Stretcher, Patient Has IV?, Rad Type, Order for future visit, Fracture of right distal radius  Left wrist fracture, Not Scheduled, *Est. 06/25/19 3:00:00 CDT  ?  2.?Left wrist fracture?S62.102A  ?Patients left wrist fracture has collapsed slightly although it does appear to be going on to union.? Can begin increasing range of motion of tolerance in the next 2 to 3 weeks.  Ordered:  Clinic Follow up, *Est. 06/25/19 3:00:00 CDT, Order for future visit, Fracture of right distal radius  Left wrist fracture, [facility]  Post-Op follow-up visit [id number] PC, Fracture of right distal radius  Left wrist fracture, [facility], 05/28/19 13:39:00 CDT  XR Wrist Left Minimum 3 Views, Routine, 05/28/19 13:08:00 CDT, Fracture, None, Stretcher, Patient Has IV?, Rad Type, Left wrist fracture, Not Scheduled, 05/28/19 13:08:00 CDT  XR Wrist Left Minimum 3 Views, Routine, *Est. 06/25/19 3:00:00 CDT, Fracture, None, Stretcher, Patient Has IV?, Rad Type, Order for future visit, Fracture of right distal radius  Left wrist fracture, Not Scheduled, *Est. 06/25/19 3:00:00 CDT  XR Wrist Right Minimum 3 Views, Routine, *Est. 06/25/19 3:00:00 CDT, Fracture, None, Stretcher, Patient Has IV?, Rad Type, Order for future visit, Fracture of right distal radius  Left wrist fracture, Not Scheduled, *Est. 06/25/19 3:00:00 CDT  ?  Referrals  Clinic Follow up, *Est. 06/25/19 3:00:00 CDT, Order for future visit, Fracture of right distal radius  Left wrist fracture, [facility]   Problem List/Past Medical History  Ongoing  Closed displaced fracture of styloid process of left radius  Closed fracture of  left distal radius  Heart attack  HLD (hyperlipidemia)  Tobacco user  Historical  Stent placement  Procedure/Surgical History  Application of a uniplane (pins or wires in 1 plane), unilateral, external fixation system (04/08/2019)  Insertion of External Fixation Device into Right Radius, Open Approach (04/08/2019)  Open treatment of distal radial intra-articular fracture or epiphyseal separation; with internal fixation of 2 fragments (04/08/2019)  ORIF Radius Distal (Bilateral) (04/08/2019)  Reposition Left Radius with Internal Fixation Device, Open Approach (04/08/2019)  Reposition Right Radius with Internal Fixation Device, Open Approach (04/08/2019)  81329 - RT CATARACT W/IOL 1 STA PHACO (Right) (04/19/2018)  Extracapsular cataract removal with insertion of intraocular lens prosthesis (1 stage procedure), manual or mechanical technique (eg, irrigation and aspiration or phacoemulsification) (04/19/2018)  Replacement of Right Lens with Synthetic Substitute, Percutaneous Approach (04/19/2018)  82480 - LT CATARACT W/IOL 1 STA PHACO (Left) (03/08/2018)  Extracapsular cataract removal with insertion of intraocular lens prosthesis (1 stage procedure), manual or mechanical technique (eg, irrigation and aspiration or phacoemulsification) (03/08/2018)  Replacement of Left Lens with Synthetic Substitute, Percutaneous Approach (03/08/2018)  Angiogram  Colonoscopy  History of tonsillectomy  Stent   Medications  Aspir-Low 81 mg oral delayed release tablet, 81 mg= 1 tab(s), Oral, Daily  aspirin 81 mg oral tablet, 81 mg= 1 tab(s), Oral, BID,? ?Not taking: Last Dose Date/Time Unknown  Chantix Continuing Month 1 mg oral tablet, 1 mg= 1 tab(s), Oral, BID  Chantix Starter Pack 0.5 mg-1 mg oral tablet  cyclobenzaprine 10 mg oral tablet, 10 mg= 1 tab(s), Oral, TID  meloxicam 15 mg oral tablet, 15 mg= 1 tab(s), Oral, Daily  methylPREDNISolone 4 mg oral tab  Neurontin 300 mg oral capsule, 600 mg= 2 cap(s), Oral, Once a day (at  bedtime)  Neurontin 300 mg oral capsule, 300 mg= 1 cap(s), Oral, BID,? ?Not taking: Last Dose Date/Time Unknown  Norco 7.5 mg-325 mg oral tablet, 1 tab(s), Oral, q6hr, PRN  oxycodone 5 mg oral tablet  Pantoprazole 40 mg ORAL EC-Tablet, 40 mg= 1 tab(s), Oral, Daily  sulfamethoxazole-trimethoprim 800 mg-160 mg oral tablet, 1 tab(s), Oral, BID  Allergies  No Known Allergies  No Known Medication Allergies  Social History  Alcohol  Current, Beer, 1-2 times per month, 04/17/2019  Current, 1-2 times per month, Alcohol use interferes with work or home: No. Drinks more than intended: No. Others hurt by drinking: No. Ready to change: No. Household alcohol concerns: No., 04/08/2019  Substance Abuse  Never, 04/17/2019  Tobacco  Current every day smoker, 08 per day. 30 year(s)., 04/17/2019  Family History  Leukemia: Father.  Health Maintenance  Health Maintenance  ???Pending?(in the next year)  ??? ??OverDue  ??? ? ? ?Coronary Artery Disease Maintenance-Antiplatelet Agent Prescribed due??and every?  ??? ? ? ?Smoking Cessation (Coronary Artery Disease) due??and every?  ??? ? ? ?Advance Directive due??01/01/19??and every 1??year(s)  ??? ? ? ?Alcohol Misuse Screening due??01/01/19??and every 1??year(s)  ??? ? ? ?Cognitive Screening due??01/01/19??and every 1??year(s)  ??? ? ? ?Functional Assessment due??01/01/19??and every 1??year(s)  ??? ? ? ?Geriatric Depression Screening due??01/01/19??and every 1??year(s)  ??? ? ? ?Smoking Cessation due??01/01/19??and every 1??year(s)  ??? ??Due?  ??? ? ? ?ADL Screening due??05/28/19??and every 1??year(s)  ??? ? ? ?Abdominal Aortic Aneurysm Screening due??05/28/19??and every 100??year(s)  ??? ? ? ?Colorectal Screening (Senior Wellness) due??05/28/19??and every 10??year(s)  ??? ? ? ?Coronary Artery Disease Maintenance-Lipid Lowering Therapy due??05/28/19??and every?  ??? ? ? ?Lung Cancer Screening due??05/28/19??and every 1??year(s)  ??? ? ? ?Pneumococcal Vaccine due??05/28/19??Variable  frequency  ??? ? ? ?Pneumococcal Vaccine due??05/28/19??and every?  ??? ? ? ?Tetanus Vaccine due??05/28/19??and every 10??year(s)  ??? ? ? ?Zoster Vaccine due??05/28/19??and every 100??year(s)  ??? ??Due In Future?  ??? ? ? ?Fall Risk Assessment not due until??01/01/20??and every 1??year(s)  ??? ? ? ?Obesity Screening not due until??01/01/20??and every 1??year(s)  ??? ? ? ?Aspirin Therapy for CVD Prevention not due until??04/09/20??and every 1??year(s)  ???Satisfied?(in the past 1 year)  ??? ??Satisfied?  ??? ? ? ?Aspirin Therapy for CVD Prevention on??04/09/19.??Satisfied by Gladys Haq RN  ??? ? ? ?Blood Pressure Screening on??05/28/19.??Satisfied by Temi Watson  ??? ? ? ?Body Mass Index Check on??05/28/19.??Satisfied by Temi Watson  ??? ? ? ?Coronary Artery Disease Maintenance-Antiplatelet Agent Prescribed on??04/07/19.  ??? ? ? ?Depression Screening on??04/25/19.??Satisfied by Temi Watson  ??? ? ? ?Diabetes Screening on??04/09/19.??Satisfied by Virginia Parks  ??? ? ? ?Fall Risk Assessment on??04/08/19.??Satisfied by Tierra Petty RN  ??? ? ? ?Obesity Screening on??05/28/19.??Satisfied by Temi Watson  ?  ?  Diagnostic Results  AP lateral bilateral wrist reviewed. ?Patients?implants appear to be?well fixed. ?Left wrist although slightly subsided does appear to be healing well. ?Right wrist?alignment maintained. ?Interval callus formation noted.

## 2022-05-02 NOTE — HISTORICAL [facility]
This is a historical note converted from Dwight. Formatting and pictures may have been removed.  Please reference Dwight for original formatting and attached multimedia. Chief Complaint  ORIF Solitario Wrist sx 4/8/19. Doing good. C/o some sorenss. Xrays today out of braces.  History of Present Illness  61-year-old male presents for follow-up of bilateral wrist?operative fixation. ?He is about 3 months out. ?Doing very well. ?Does have some weakness?to both wrist as well as some ongoing swelling although much improved. ?Denies any significant pain.  Review of Systems  Denies fevers, chills, chest pain, shortness of breath. Comprehensive review of systems performed and otherwise negative except as noted in HPI.  Physical Exam  Vitals & Measurements  BP:?117/69?  HT:?185?cm? WT:?84?kg? BMI:?24.54?  General: No acute distress, alert and oriented, healthy appearing?  HEENT: Head is atraumatic, mucous membranes are moist  Neck: Supples, no JVD  Cardiovascular: Palpable dorsalis pedis and posterior tibial pulses, regular rate and rhythm to those pulses  Lungs: Breathing non-labored  Skin: no rashes appreciated  Neurologic: Can flex and extend knees, ankles, and toes. Sensation is grossly intact  ?  Bilateral wrist:  He has sensation intact distally bilateral hands. ?His range of motion is actually pretty good he is full pronation and supination that is full. ?He can palmar flex to 30. ?Dorsiflex to 50 bilaterally.? Incisions are all well-healed. ?No pain crepitus with motion.  Assessment/Plan  1.?Fracture of right distal radius?S52.501A  ?Patients right distal right fracture appears well-healed. ?Is not subsided. ?The wrist is well reduced. ?Implants are in good position.  Ordered:  Clinic Follow up, *Est. 07/16/19 3:00:00 CDT, Order for future visit, Fracture of right distal radius  Left wrist fracture, [facility]  Post-Op follow-up visit [id number] PC, Fracture of right distal radius  Left wrist fracture, [facility]  Clinic, 07/02/19 10:06:00 CDT  PT/OT External Referral, 07/02/19 10:06:00 CDT, Fracture of right distal radius  Left wrist fracture, Evaluate and Treat, 2 X Week, Patient has IV, Standard Precautions, ***General OT Orders** ROM/strengthening to B wrist - WBAT  ?  2.?Left wrist fracture?S62.102A  ?Patient left wrist fracture has subsided slightly. ?Implants appear out of the joint however would like to get a CT scan to evaluate this as well as to evaluate for bony union. ?If needed with revision of his operative fixation,?could perform this?in the near future or possibly remove his hardware if his?fracture is healed.? We will send to physical therapy to work on range of motion and strengthening of both wrist.  Ordered:  Clinic Follow up, *Est. 07/16/19 3:00:00 CDT, Order for future visit, Fracture of right distal radius  Left wrist fracture, [facility]  CT Extremity Lower Left W/O Contrast, Routine, 07/02/19 10:07:00 CDT, Other (please specify), None, Stretcher, Patient Has IV?, left wrist pain - eval for bony union, Rad Type, Order for future visit, Left wrist fracture, Schedule this test, [facility], 07/02/1...  Post-Op follow-up visit [id number] PC, Fracture of right distal radius  Left wrist fracture, [facility], 07/02/19 10:06:00 CDT  PT/OT External Referral, 07/02/19 10:06:00 CDT, Fracture of right distal radius  Left wrist fracture, Evaluate and Treat, 2 X Week, Patient has IV, Standard Precautions, ***General OT Orders** ROM/strengthening to B wrist - WBAT  ?  Referrals  Clinic Follow up, *Est. 07/16/19 3:00:00 CDT, Order for future visit, Fracture of right distal radius  Left wrist fracture, [facility]  PT/OT External Referral, 07/02/19 10:06:00 CDT, Fracture of right distal radius  Left wrist fracture, Evaluate and Treat, 2 X Week, Patient has IV, Standard Precautions, ***General OT Orders** ROM/strengthening to B wrist - WBAT   Problem List/Past Medical  History  Ongoing  Closed displaced fracture of styloid process of left radius  Closed fracture of left distal radius  Heart attack  HLD (hyperlipidemia)  Tobacco user  Historical  Stent placement  Procedure/Surgical History  Application of a uniplane (pins or wires in 1 plane), unilateral, external fixation system (04/08/2019)  Insertion of External Fixation Device into Right Radius, Open Approach (04/08/2019)  Open treatment of distal radial intra-articular fracture or epiphyseal separation; with internal fixation of 2 fragments (04/08/2019)  ORIF Radius Distal (Bilateral) (04/08/2019)  Reposition Left Radius with Internal Fixation Device, Open Approach (04/08/2019)  Reposition Right Radius with Internal Fixation Device, Open Approach (04/08/2019)  24568 - RT CATARACT W/IOL 1 STA PHACO (Right) (04/19/2018)  Extracapsular cataract removal with insertion of intraocular lens prosthesis (1 stage procedure), manual or mechanical technique (eg, irrigation and aspiration or phacoemulsification) (04/19/2018)  Replacement of Right Lens with Synthetic Substitute, Percutaneous Approach (04/19/2018)  18185 - LT CATARACT W/IOL 1 STA PHACO (Left) (03/08/2018)  Extracapsular cataract removal with insertion of intraocular lens prosthesis (1 stage procedure), manual or mechanical technique (eg, irrigation and aspiration or phacoemulsification) (03/08/2018)  Replacement of Left Lens with Synthetic Substitute, Percutaneous Approach (03/08/2018)  Angiogram  Colonoscopy  History of tonsillectomy  Stent   Medications  Aspir-Low 81 mg oral delayed release tablet, 81 mg= 1 tab(s), Oral, Daily  aspirin 81 mg oral tablet, 81 mg= 1 tab(s), Oral, BID,? ?Not taking: Last Dose Date/Time Unknown  Chantix Continuing Month 1 mg oral tablet, 1 mg= 1 tab(s), Oral, BID  Chantix Starter Pack 0.5 mg-1 mg oral tablet  cyclobenzaprine 10 mg oral tablet, 10 mg= 1 tab(s), Oral, TID  meloxicam 15 mg oral tablet, 15 mg= 1 tab(s), Oral,  Daily  methylPREDNISolone 4 mg oral tab,? ?Not Taking, Completed Rx  Neurontin 300 mg oral capsule, 300 mg= 1 cap(s), Oral, BID,? ?Not taking: Last Dose Date/Time Unknown  Neurontin 300 mg oral capsule, 600 mg= 2 cap(s), Oral, Once a day (at bedtime)  Norco 7.5 mg-325 mg oral tablet, 1 tab(s), Oral, q6hr, PRN,? ?Still taking, not as prescribed: PRN  oxycodone 5 mg oral tablet,? ?Not taking  Pantoprazole 40 mg ORAL EC-Tablet, 40 mg= 1 tab(s), Oral, Daily  sulfamethoxazole-trimethoprim 800 mg-160 mg oral tablet, 1 tab(s), Oral, BID,? ?Not Taking, Completed Rx  Allergies  No Known Allergies  No Known Medication Allergies  Social History  Abuse/Neglect  No, No, Yes, 07/02/2019  Alcohol  Current, 1-2 times per month, Alcohol use interferes with work or home: No. Drinks more than intended: No. Others hurt by drinking: No. Ready to change: No. Household alcohol concerns: No., 04/08/2019  Substance Use  Never, 03/01/2018  Tobacco  10 or more cigarettes (1/2 pack or more)/day in last 30 days, Cigarettes, No, 07/02/2019  Family History  Leukemia: Father.  Health Maintenance  Health Maintenance  ???Pending?(in the next year)  ??? ??OverDue  ??? ? ? ?Coronary Artery Disease Maintenance-Antiplatelet Agent Prescribed due??and every?  ??? ? ? ?Smoking Cessation (Coronary Artery Disease) due??and every?  ??? ? ? ?Advance Directive due??01/01/19??and every 1??year(s)  ??? ? ? ?Alcohol Misuse Screening due??01/01/19??and every 1??year(s)  ??? ? ? ?Cognitive Screening due??01/01/19??and every 1??year(s)  ??? ? ? ?Functional Assessment due??01/01/19??and every 1??year(s)  ??? ? ? ?Geriatric Depression Screening due??01/01/19??and every 1??year(s)  ??? ? ? ?Smoking Cessation due??01/01/19??and every 1??year(s)  ??? ??Due?  ??? ? ? ?ADL Screening due??07/02/19??and every 1??year(s)  ??? ? ? ?Abdominal Aortic Aneurysm Screening due??07/02/19??and every 100??year(s)  ??? ? ? ?Colorectal Screening (Senior Wellness) due??07/02/19??and every  10??year(s)  ??? ? ? ?Coronary Artery Disease Maintenance-Lipid Lowering Therapy due??07/02/19??and every?  ??? ? ? ?Lung Cancer Screening due??07/02/19??and every 1??year(s)  ??? ? ? ?Pneumococcal Vaccine due??07/02/19??Variable frequency  ??? ? ? ?Pneumococcal Vaccine due??07/02/19??and every?  ??? ? ? ?Tetanus Vaccine due??07/02/19??and every 10??year(s)  ??? ? ? ?Zoster Vaccine due??07/02/19??and every 100??year(s)  ??? ??Due In Future?  ??? ? ? ?Fall Risk Assessment not due until??01/01/20??and every 1??year(s)  ??? ? ? ?Obesity Screening not due until??01/01/20??and every 1??year(s)  ??? ? ? ?Aspirin Therapy for CVD Prevention not due until??04/09/20??and every 1??year(s)  ???Satisfied?(in the past 1 year)  ??? ??Satisfied?  ??? ? ? ?Aspirin Therapy for CVD Prevention on??04/09/19.??Satisfied by Gladys Haq RN  ??? ? ? ?Blood Pressure Screening on??07/02/19.??Satisfied by Arcelia Rodriguez LJalil L.  ??? ? ? ?Body Mass Index Check on??07/02/19.??Satisfied by Arcelia Rodriguez LJalil L.  ??? ? ? ?Coronary Artery Disease Maintenance-Antiplatelet Agent Prescribed on??04/07/19.  ??? ? ? ?Depression Screening on??04/25/19.??Satisfied by Temi Watson  ??? ? ? ?Diabetes Screening on??04/09/19.??Satisfied by Virginia Parks  ??? ? ? ?Fall Risk Assessment on??04/08/19.??Satisfied by Tierra Petty RN  ??? ? ? ?Obesity Screening on??07/02/19.??Satisfied by Arcelia Rodriguez  ?  Diagnostic Results  AP lateral bilateral wrist reviewed. ?Right wrist is well-healed with implants in good position. ?Left wrist does show some subsidence of the fracture. ?It appears that his hardware is out of his joint. ?Does appear to have a small amount of bridging callus.

## 2022-05-02 NOTE — HISTORICAL [facility]
This is a historical note converted from [employer]. Formatting and pictures may have been removed.  Please reference [employer] for original formatting and attached multimedia. Chief Complaint  Patient is trauma 1 activation from fall off of 10-12 ft ladder, B/P ?81/50 no thinners  History of Present Illness  Randy Gutierrez?is a?69 Years?old?Male that presented as a level 1 trauma following a fall from a ladder about 12 feet up. He is alert and oriented now. He denied LOC. He states he was reaching out trying to trim some branches when he lost his balance and fell. He landed on his hands to brace his fall. When EMS was on scene, he was sitting up in a chair. His initial blood pressure was found to be 81 systolic so a level 1 was activated. On arrival, his BP was in the 100s systolic. GCS was 15. He noted he had pain in his bilateral wrists, worse on his right. He also had lower back pain, but it was the same as the chronic back pain he always has. He denies chest pain, shortness of breath, abdominal pain, or other pain. His BP responded to fluids and was 120s systolic. He was deemed stable for transfer to CT.  Physical Exam  Vitals & Measurements  HR:?75(Peripheral)? HR:?75(Monitored)? RR:?17? BP:?103/61? SpO2:?95%?  Gen: NAD  Neuro: no focal deficits, moving all extremities, sensation intact  HEENT: EOMI, PERRL, no facial lacerations or injuries  Neck: FROM, no TTP  Chest: no bruising or deformities  Cardio: RR, no murmurs, distal pulses 2+  Resp: non-labored, CTAB  Abd: soft, NT, ND, no guarding, rebound, or peritoneal signs  Ext: Right shoulder with bruising and superficial abrasions, right proximal arm with linear bruising, right wrist very swollen, pain with movement, cap refill less than 2 seconds, sensation intact; left wrist with TTP and with movement, no deformity, cap refill less than 2 seconds; bilateral lower extremities with brisk cap refill and full ROM  Back: no step offs, no TTP  Rectal: normal tone, no  gross blodd  Assessment/Plan  Randy Gutierrez?is a?69 Years?old?Male that presented following a fall from about 12 feet, found to have left distal radial fractures and right distal radius and ulnar styloid fractures  ?  -admit to trauma  -ortho consulted, will follow up plan  -CLD, NPO MN  -pain control  -nausea control  -Lov  -IVF  -am labs, will replace K today   Problem List/Past Medical History  Ongoing  No qualifying data  Historical  No qualifying data  Medications  Inpatient  No active inpatient medications  Home  No active home medications  Allergies  No active allergies  Lab Results  Labs Last 24 Hours?  ?Chemistry? Hematology/Coagulation?   Sodium Lvl: 137 mmol/L (04/07/19 11:36:00) PT:?15.1 second(s)?High (04/07/19 11:36:00)   Potassium Lvl:?3.4 mmol/L?Low (04/07/19 11:36:00) INR: 1.2 (04/07/19 11:36:00)   Chloride: 104 mmol/L (04/07/19 11:36:00) PTT: 25.8 second(s) (04/07/19 11:36:00)   CO2: 27 mmol/L (04/07/19 11:36:00) WBC:?13.9 x10(3)/mcL?High (04/07/19 11:36:00)   Calcium Lvl:?8.4 mg/dL?Low (04/07/19 11:36:00) RBC:?4.2 x10(6)/mcL?Low (04/07/19 11:36:00)   Glucose Lvl:?130 mg/dL?High (04/07/19 11:36:00) Hgb:?12.2 gm/dL?Low (04/07/19 11:36:00)   BUN:?21 mg/dL?High (04/07/19 11:36:00) Hct:?39.2 %?Low (04/07/19 11:36:00)   Creatinine: 1.26 mg/dL (04/07/19 11:36:00) Platelet: 307 x10(3)/mcL (04/07/19 11:36:00)   eGFR-AA: >60 (04/07/19 11:36:00) MCV: 93.3 fL (04/07/19 11:36:00)   eGFR-REGINA: 54 mL/min/1.73 m2 (04/07/19 11:36:00) MCH: 29 pg (04/07/19 11:36:00)   Amylase Lvl: 58 unit/L (04/07/19 11:36:00) MCHC:?31.1 gm/dL?Low (04/07/19 11:36:00)   Bili Total: 0.3 mg/dL (04/07/19 11:36:00) RDW: 14.1 % (04/07/19 11:36:00)   Bili Direct: 0.1 mg/dL (04/07/19 11:36:00) MPV: 10.2 fL (04/07/19 11:36:00)   Bili Indirect: 0.2 mg/dL (04/07/19 11:36:00) Abs Neut: 7.77 x10(3)/mcL (04/07/19 11:36:00)   AST:?167 unit/L?High (04/07/19 11:36:00) Neutro Auto: 56 % (04/07/19 11:36:00)   ALT:?85 unit/L?High (04/07/19 11:36:00)  Lymph Auto: 33 % (04/07/19 11:36:00)   Alk Phos: 79 unit/L (04/07/19 11:36:00) Mono Auto: 7 % (04/07/19 11:36:00)   Total Protein: 6.6 gm/dL (04/07/19 11:36:00) Eos Auto: 3 % (04/07/19 11:36:00)   Albumin Lvl:?3.3 gm/dL?Low (04/07/19 11:36:00) Abs Eos: 0.4 x10(3)/mcL (04/07/19 11:36:00)   Globulin: 3.3 gm/dL (04/07/19 11:36:00) Basophil Auto: 1 % (04/07/19 11:36:00)   A/G Ratio:?1 ratio?Low (04/07/19 11:36:00) Abs Neutro: 7.77 x10(3)/mcL (04/07/19 11:36:00)   Lactic Acid Lvl: 1 mmol/L (04/07/19 13:28:00) Abs Lymph: 4.6 x10(3)/mcL (04/07/19 11:36:00)   Lipase Lvl: 125 unit/L (04/07/19 11:36:00) Abs Mono: 1 x10(3)/mcL (04/07/19 11:36:00)   POC Troponin: 0 ng/mL (04/07/19 11:54:00) Abs Baso: 0.2 x10(3)/mcL (04/07/19 11:36:00)   ?  ?  Diagnostic Results  Accession:?[id number]  Order:?CT Thorax W Contrast  Report Dt/Tm:?04/07/2019 12:45  Report:?  EXAMINATION  CT Thorax W Contrast  CT Abdomen and Pelvis W Contrast  ?  TECHNIQUE  Helical-acquisition CT images were obtained following the intravenous  administration of iodinated contrast media. Oral contrast was not  utilized.  Multiplanar reformats were accomplished by a CT technologist at a  separate workstation and pushed to PACS for physician review.  ?  Total DLP (mGy-cm): 1590.2 (value may include radiation due to  concomitantly performed CT imaging)  Automated tube current modulation and/or weight-based exposure dosing  is utilized, when appropriate, to reach lowest reasonably achievable  exposure rate.  ?  INDICATION  Level 1 trauma secondary to 10 foot fall from ladder.  ?  Comparison: No similar modality comparisons are available at the time  of exam interpretation.  ?  FINDINGS  Images were reviewed in soft tissue, lung, and bone windows.  ?  THORAX  Lines/tubes: None visualized.  ?  MEDIASTINUM  Heart: Cardiac chambers are of normal size. There is no pericardial  effusion.  Vessels: There is significant irregular mural plaquing of the  descending thoracic  aorta, with scattered areas of atherosclerotic  calcification also noted. Scattered coronary calcification versus  stenting is present. Limited assessment of the pulmonary arterial  vasculature demonstrates no large central filling defect.  Esophagus: No acute or focal abnormality.  ?  PULMONARY  Central airways: Widely patent.  Lungs: Biapical emphysematous changes are noted. No airspace  consolidation, focal lung lesion, or other concerning pulmonary  process is identified. There are numerous scattered miniscule  calcified and noncalcified nodules throughout both lungs. Example  nodule is appreciated within the lateral right upper lobe,  approximately 3 mm diameter (series 3, image 32).  Pleura: No thickening, fluid, or air.  ?  LYMPH NODES: No pathologically enlarged or necrotic lymph nodes are  visualized. There are multiple calcified peritracheal and right hilar  nodes.  THYROID: Unremarkable.  ?  MUSCULOSKELETAL  Thoracic wall: No acute or focal abnormality.  Bony thorax: There is appearance of mild bone demineralization  throughout the thoracic spine. Minimal degenerative changes of the  bony thorax are noted. Chronic sequela of multilevel remote healed  right lateral rib fractures incidentally mentioned. No acutely  displaced osseous abnormality or aggressive skeletal lesion is  visualized.  ?  ?  ABDOMEN/PELVIS  Lines/tubes: None visualized.  ?  HEPATOBILIARY  Liver: No acute abnormality or focal lesion. Scattered punctate  calcifications are noted.  Gallbladder: No evidence of intraluminal stone, wall thickening, or  pericholecystic inflammation. Nonspecific focal calcification  appreciated adjacent to the external inferior mural surface.  Bile ducts: No convincing obstructive process. No focal filling defect  or evidence for extrinsic compression.  Pancreas: No acute or focal abnormality.  Spleen: Numerous calcified foci scattered throughout the spleen. No  acute parenchymal abnormality or contour  irregularity identified.  ?  GENITOURINARY  Adrenal glands: The right adrenal gland is inconspicuous secondary to  diffuse fat stranding. No evidence of active contrast extravasation is  visualized. The left adrenal gland is unremarkable.  Kidneys/Ureters: No focal lesion or complex cyst. Nonobstructing right  lower pole nephrolithiasis. No evidence of distal obstructive  uropathy.  Urinary bladder: No focal wall thickening or convincing intraluminal  abnormality.  Reproductive: Prostate is prominent in size with scattered parenchymal  calcifications.  ?  GASTROINTESTINAL  Stomach: No acute or focal abnormality.  Small bowel: No focal abnormality or suggestion of high-grade  obstruction.  Appendix: No acute or focal abnormality.  Large bowel: No acute or focal abnormality.  ?  MESENTERY/OMENTUM: No acute or focal abnormality.  PERITONEAL CAVITY: No free fluid or air.  RETROPERITONEUM: No acute or focal lesion. No fluid collection.  ?  VASCULATURE: Extensive irregular atheromatous plaquing appreciated  throughout the abdominal aorta. Scattered atherosclerotic  calcifications are also noted along the aortoiliac course.  LYMPH NODES: No pathologic enlargement or necrotic process.  ?  MUSCULOSKELETAL  Body wall: No acute or focal abnormality.  Musculature: No acute or new focal abnormality is identified.  Osseous: Degenerative changes are most notable at the L4-5 level. No  acute osseous displacement or aggressive skeletal lesion visualized.  ?  IMPRESSION  1. ?No evidence of traumatic thoracic injury or other acute  abnormality of the chest.  ?  2. ?Extensive fat stranding adjacent to the right adrenal gland,  without evidence of active contrast extravasation or focal lesion.  Otherwise, the adrenal parenchyma is obscured and overall  characterization/grading of injury is limited. No other evidence of  acute abdominopelvic abnormality is visualized.  ?  3. ?Extensive irregular mural plaquing throughout the thoracic  and  abdominal aorta.  ?  4. ?Constellation of pulmonary nodules, calcified mediastinal/hilar  lymph nodes, and calcified hepatic and splenic foci are consistent  with chronic sequela of prior granulomatous disease.  ?  5. ?Additional incidental, nonacute findings described above.  ?  Dr. Kumar (Radiology) provided a verbal report of the above findings  to Dr. Roberts (Emergency Dept.), via telephone at the time of exam  interpretation/dictation (4/7/2019 12:42 PM CDT).  ?  ?  Accession:?[id number]  Order:?CT Cervical Spine W/O Contrast  Report Dt/Tm:?04/07/2019 12:26  Report:?  EXAMINATION  CT Cervical Spine W/O Contrast  ?  TECHNIQUE  Helical-acquisition CT images were obtained without the intravenous  administration of iodine-based contrast media.  Multiplanar reformats of the cervical spine were accomplished by a CT  technologist at a separate workstation and pushed to PACS for  physician review.  ?  Total DLP (mGy-cm): 722 (value may include radiation due to  concomitantly performed CT imaging)  Automated tube current modulation and/or weight-based exposure dosing  is utilized, when appropriate, to reach lowest reasonably achievable  exposure rate.  ?  INDICATION  Level 1 trauma due to fall from ladder, approximately 10-foot height.  ?  Comparison: No similar modality comparison is available.  ?  FINDINGS  Images were reviewed in bone, soft tissue, and lung windows.  Absence of intravenous contrast limits assessment of the visualized  soft tissues and vascular structures.  ?  SPINAL COLUMN  Curvature: Normal lordotic configuration is maintained.  Vertebrae: Appearance of mild diffuse bone demineralization  incidentally mentioned. No compression deformity or focal abnormality.  Mild multilevel degenerative uncovertebral and facet arthrosis  appreciated bilaterally.  Posterior elements: No acute displacement or other focal abnormality.  Alignment: No listhesis or evidence of traumatic subluxation.  Disc spaces:  No significant intervertebral narrowing.  Spinal canal: No evidence of significant stenosis.  Neuroforamina: No significant narrowing appreciated bilaterally.  ?  EXTRASPINAL TISSUES  Prevertebral: No thickening or focal contour irregularity.  Musculature: No acute or focal lesion.  ?  MISCELLANEOUS  Thyroid: No infiltrative contour irregularity or focal nodule.  Mediastinum: Unremarkable.  Lung apices: Biapical emphysematous changes. Otherwise, no evidence of  acute or focal abnormality.  Skull base: No acute osseous irregularity or focal intracranial  lesion.  Intracranial: No acute or focal abnormality.  ?  IMPRESSION  1. ?No acute or focal cervical spine disruption.  2. ?Degenerative changes and appearance of bone demineralization  throughout the cervical spine.  ?  Additional incidental/chronic details described above.  ?  ?  Accession:?[id number]  Order:?CT Head W/O Contrast  Report Dt/Tm:?04/07/2019 12:22  Report:?  EXAMINATION  CT Head W/O Contrast  ?  TECHNIQUE  Axial CT images were acquired without the intravenous administration  of iodine-based contrast media.  Multiplanar reconstructions were accomplished by a CT technologist at  a separate workstation and pushed to PACS for physician review.  ?  Total DLP (mGy-cm): 852 (value may include radiation due to  concomitantly performed CT imaging)  Automated tube current modulation and/or weight-based exposure dosing  is utilized, when appropriate, to reach lowest reasonably achievable  exposure rate.  ?  INDICATION  Trauma  ?  Comparison: No similar modality comparisons are available at the time  of exam interpretation.  ?  FINDINGS  Images were reviewed in subdural, brain, soft tissue, and bone  windows.  Motion/streak artifact limits assessment of the posterior fossa.  ?  INTRACRANIAL  Hemorrhage: None appreciated.  Extra-axial spaces: No abnormal fluid. The basal cisterns are  preserved.  Midline shift: None appreciated.  Ventricles: No significant  asymmetry or evidence for hydrocephalus.  ?  Cerebral parenchyma: Mild diffuse intracranial atrophy is present,  with proportional enlargement of the sulci and ventricles. There is  subtle bilateral periventricular white matter hypoattenuation, typical  appearance for sequela of chronic microvessel ischemia. No findings  suggestive of large vascular territory ischemic insult are  appreciated. Gray-white differentiation is preserved. No focal mass  lesion or mass-effect is identified.  Posterior fossa: Symmetric cerebellar atrophy. Otherwise unremarkable.  Sella: Unremarkable.  ?  Vasculature: Scattered atherosclerotic calcifications. No hyperdense  artery or vein is visualized.  Dural sinuses: No focal abnormality.  ?  MISCELLANEOUS  Scalp: Small right frontal scalp contusion is noted, with no  subcutaneous gas or dense foreign body.  Calvarium: No focal abnormality.  Skull base/craniocervical junction: Mastoid air cells are well  aerated. No focal abnormality.  ?  Facial structures: No acutely displaced fracture.  Paranasal sinuses: Well-aerated.  ?  Visualized cervical spine: No acute abnormality.  ?  IMPRESSION  1. ?No acute intracranial abnormality.  2. ?Small right frontal scalp contusion without underlying foreign  body, subcutaneous air, or osseous displacement.  3. ?Age-related atrophy and chronic sequela of white matter  microvascular disease.  ?  ?  Accession:?[id number]  Order:?XR Wrist Right 2 Views  Report Dt/Tm:?04/07/2019 12:03  Report:?  AP pelvis:  ?  INDICATION: Multiple trauma  ?  FINDINGS: A frontal view the pelvis and both hips was submitted. No  fracture, dislocation or focal bone lesions are identified. No  radiopaque foreign bodies are visible.  ?  IMPRESSION: No evidence of acute osseous injury at the bony pelvis or  hips bilaterally.  ?  ?  Accession:?[id number]  Order:?XR Chest 1 View  Report Dt/Tm:?04/07/2019 12:01  Report:?  Portable AP chest:  ?  INDICATION: Acute  trauma  ?  FINDINGS: No prior studies are available for comparison. Heart size is  normal and lungs are clear bilaterally. Pulmonary vasculature is  normal. The rib cage is intact.  ?  ?  Right shoulder 3 views:  ?  FINDINGS: 3 views of the right shoulder including a transscapular view  were performed. No fracture, dislocation or abnormal soft tissue  calcifications are identified.  ?  ?  Right wrist 3 views:  ?  FINDINGS: An acute, comminuted fracture is identified involving the  distal right radius also involving the articular surface of the  radiocarpal joint. An additional fracture is also identified across  the base of the ulnar styloid. The carpal bones are intact and  normally aligned on the lateral view.  ?  ?  ?  IMPRESSION:  1. No evidence of acute pulmonary disease or significant injury to the  chest.  2. No evidence of acute injury at the right shoulder.  3. Acute fractures of the distal radius and ulnar styloid at the right  wrist.  ?  ?  ?      Plan for ORIF of both wrist with ortho 4/8

## 2023-04-03 ENCOUNTER — LAB VISIT (OUTPATIENT)
Dept: LAB | Facility: HOSPITAL | Age: 73
End: 2023-04-03
Payer: COMMERCIAL

## 2023-04-03 DIAGNOSIS — Z79.01 LONG TERM (CURRENT) USE OF ANTICOAGULANTS: ICD-10-CM

## 2023-04-03 DIAGNOSIS — K31.819 PYLORIC ANTRAL VASCULAR ECTASIA: Primary | ICD-10-CM

## 2023-04-03 LAB
ANISOCYTOSIS BLD QL SMEAR: ABNORMAL
BASOPHILS # BLD AUTO: 0.13 X10(3)/MCL (ref 0–0.2)
BASOPHILS NFR BLD AUTO: 1.8 %
EOSINOPHIL # BLD AUTO: 0.21 X10(3)/MCL (ref 0–0.9)
EOSINOPHIL NFR BLD AUTO: 2.8 %
ERYTHROCYTE [DISTWIDTH] IN BLOOD BY AUTOMATED COUNT: ABNORMAL %
FERRITIN SERPL-MCNC: 18.81 NG/ML (ref 21.81–274.66)
HCT VFR BLD AUTO: 32.7 % (ref 42–52)
HGB BLD-MCNC: 9.4 G/DL (ref 14–18)
HYPOCHROMIA BLD QL SMEAR: ABNORMAL
IMM GRANULOCYTES # BLD AUTO: 0.02 X10(3)/MCL (ref 0–0.04)
IMM GRANULOCYTES NFR BLD AUTO: 0.3 %
IRON SATN MFR SERPL: 8 % (ref 20–50)
IRON SERPL-MCNC: 29 UG/DL (ref 65–175)
LYMPHOCYTES # BLD AUTO: 1.94 X10(3)/MCL (ref 0.6–4.6)
LYMPHOCYTES NFR BLD AUTO: 26.2 %
MCH RBC QN AUTO: 22.2 PG (ref 27–31)
MCHC RBC AUTO-ENTMCNC: 28.7 G/DL (ref 33–36)
MCV RBC AUTO: 77.1 FL (ref 80–94)
MICROCYTES BLD QL SMEAR: ABNORMAL
MONOCYTES # BLD AUTO: 0.81 X10(3)/MCL (ref 0.1–1.3)
MONOCYTES NFR BLD AUTO: 10.9 %
NEUTROPHILS # BLD AUTO: 4.29 X10(3)/MCL (ref 2.1–9.2)
NEUTROPHILS NFR BLD AUTO: 58 %
NRBC BLD AUTO-RTO: 0 %
PLATELET # BLD AUTO: 421 X10(3)/MCL (ref 130–400)
PLATELET # BLD EST: ABNORMAL 10*3/UL
PMV BLD AUTO: 9.6 FL (ref 7.4–10.4)
POIKILOCYTOSIS BLD QL SMEAR: ABNORMAL
POLYCHROMASIA BLD QL SMEAR: ABNORMAL
RBC # BLD AUTO: 4.24 X10(6)/MCL (ref 4.7–6.1)
RBC MORPH BLD: ABNORMAL
SCHISTOCYTE (OLG): ABNORMAL
TEAR DROP CELL (OLG): ABNORMAL
TIBC SERPL-MCNC: 326 UG/DL (ref 69–240)
TIBC SERPL-MCNC: 355 UG/DL (ref 250–450)
TRANSFERRIN SERPL-MCNC: 331 MG/DL (ref 163–344)
WBC # SPEC AUTO: 7.4 X10(3)/MCL (ref 4.5–11.5)

## 2023-04-03 PROCEDURE — 83550 IRON BINDING TEST: CPT

## 2023-04-03 PROCEDURE — 82728 ASSAY OF FERRITIN: CPT

## 2023-04-03 PROCEDURE — 36415 COLL VENOUS BLD VENIPUNCTURE: CPT

## 2023-04-03 PROCEDURE — 85025 COMPLETE CBC W/AUTO DIFF WBC: CPT

## 2024-01-26 ENCOUNTER — OFFICE VISIT (OUTPATIENT)
Dept: URGENT CARE | Facility: CLINIC | Age: 74
End: 2024-01-26
Payer: COMMERCIAL

## 2024-01-26 VITALS
TEMPERATURE: 98 F | RESPIRATION RATE: 18 BRPM | OXYGEN SATURATION: 97 % | HEIGHT: 73 IN | HEART RATE: 101 BPM | WEIGHT: 169 LBS | SYSTOLIC BLOOD PRESSURE: 101 MMHG | DIASTOLIC BLOOD PRESSURE: 67 MMHG | BODY MASS INDEX: 22.4 KG/M2

## 2024-01-26 DIAGNOSIS — R05.9 COUGH, UNSPECIFIED TYPE: ICD-10-CM

## 2024-01-26 DIAGNOSIS — U07.1 COVID: Primary | ICD-10-CM

## 2024-01-26 DIAGNOSIS — U07.1 COVID-19 VIRUS DETECTED: ICD-10-CM

## 2024-01-26 DIAGNOSIS — R11.10 VOMITING, UNSPECIFIED VOMITING TYPE, UNSPECIFIED WHETHER NAUSEA PRESENT: ICD-10-CM

## 2024-01-26 DIAGNOSIS — R19.7 DIARRHEA, UNSPECIFIED TYPE: ICD-10-CM

## 2024-01-26 LAB
CTP QC/QA: YES
CTP QC/QA: YES
POC MOLECULAR INFLUENZA A AGN: NEGATIVE
POC MOLECULAR INFLUENZA B AGN: NEGATIVE
SARS-COV-2 RDRP RESP QL NAA+PROBE: POSITIVE

## 2024-01-26 PROCEDURE — 87635 SARS-COV-2 COVID-19 AMP PRB: CPT | Mod: QW,,, | Performed by: FAMILY MEDICINE

## 2024-01-26 PROCEDURE — 87502 INFLUENZA DNA AMP PROBE: CPT | Mod: QW,,, | Performed by: FAMILY MEDICINE

## 2024-01-26 PROCEDURE — 99203 OFFICE O/P NEW LOW 30 MIN: CPT | Mod: ,,, | Performed by: FAMILY MEDICINE

## 2024-01-26 RX ORDER — ONDANSETRON 4 MG/1
4 TABLET, ORALLY DISINTEGRATING ORAL EVERY 6 HOURS PRN
Qty: 12 TABLET | Refills: 0 | Status: SHIPPED | OUTPATIENT
Start: 2024-01-26 | End: 2024-01-29

## 2024-01-26 RX ORDER — ASPIRIN 81 MG/1
TABLET ORAL
COMMUNITY

## 2024-01-26 RX ORDER — GABAPENTIN 600 MG/1
600 TABLET ORAL
COMMUNITY
Start: 2022-02-07

## 2024-01-26 RX ORDER — HYOSCYAMINE SULFATE 0.12 MG/1
0.12 TABLET SUBLINGUAL EVERY 6 HOURS PRN
Qty: 12 TABLET | Refills: 0 | Status: SHIPPED | OUTPATIENT
Start: 2024-01-26 | End: 2024-01-29

## 2024-01-26 RX ORDER — ROSUVASTATIN CALCIUM 10 MG/1
TABLET, COATED ORAL
COMMUNITY
Start: 2022-02-07

## 2024-01-26 NOTE — PATIENT INSTRUCTIONS
Plan:   COVID Positive  Start multi vitamin daily. Current CDC guidelines recommend that you quarantine for 5 days starting the day after your symptoms began. Quarantine can end after 5 days as long as the last 24 hours of quarantine you are fever free and there is improvement of all your symptoms. Wear a mask around others for 5 additional days after quarantine. Treat your symptoms as you would the common cold. If you live with anybody, isolate yourself in a separate bedroom and use a separate bathroom. If your symptoms worsen or you develop shortness of breath or a fever over 103, seek medical attention immediately.   Hydrate with Pedialyte, Gatorade or powerade. When you decide to eat, keep your diet simple and bland. Bread, crackers, bananas, rice or broth for example. You can take immodium over the counter for non-bloody diarrhea. If there is blood in your diarrhea or vomit, seek medical attention immediately. If you have abdominal pain or worsening of your abdominal pain, seek medical attention immediately in the emergency department. If you have diarrhea and your diarrhea lasts for 4 consecutive days or longer, seek medical attention immediately, you will need stool studies at that time.

## 2024-01-26 NOTE — PROGRESS NOTES
"Subjective:      Patient ID: Randy Gutierrez is a 74 y.o. male.    Vitals:  height is 6' 1" (1.854 m) and weight is 76.7 kg (169 lb). His temperature is 97.9 °F (36.6 °C). His blood pressure is 101/67 and his pulse is 101. His respiration is 18 and oxygen saturation is 97%.     Chief Complaint: Fatigue ( Patient is a 74 y.o. male who presents to urgent care with complaints of fatigue, n/v/d,cough,body aches since last night Pt has been traveling . Patient denies fever or sore throat. )     Patient is a 74 y.o. male who presents to urgent care with complaints of fatigue, n/v/d,cough,body aches since last night Pt has been traveling . Patient denies fever or sore throat shortness of breath dizziness or lightheadedness.  Denies any blood in the stool.  Denies any sharp or severe abdominal pain    Fatigue  Associated symptoms include fatigue, nausea and vomiting.       Constitution: Positive for fatigue.   HENT: Negative.     Neck: neck negative.   Cardiovascular: Negative.    Eyes: Negative.    Respiratory: Negative.     Gastrointestinal:  Positive for nausea, vomiting and diarrhea.   Genitourinary: Negative.    Musculoskeletal: Negative.    Skin: Negative.    Allergic/Immunologic: Negative.    Neurological: Negative.    Hematologic/Lymphatic: Negative.       Objective:     Physical Exam   Constitutional: He is oriented to person, place, and time. He appears well-developed. He is cooperative.  Non-toxic appearance. He does not appear ill. No distress.   HENT:   Head: Normocephalic and atraumatic.   Ears:   Right Ear: Hearing and external ear normal.   Left Ear: Hearing and external ear normal.   Mouth/Throat: Mucous membranes are normal.   Eyes: Conjunctivae and lids are normal.   Neck: Trachea normal and phonation normal. Neck supple. No edema present. No erythema present. No neck rigidity present.   Cardiovascular: Normal rate, regular rhythm and normal heart sounds.   Pulmonary/Chest: Effort normal and breath " "sounds normal. No stridor. No respiratory distress. He has no decreased breath sounds. He has no wheezes. He has no rhonchi. He has no rales.   Abdominal: Normal appearance. He exhibits no distension and no mass. Soft. There is no abdominal tenderness. There is no rebound and no guarding. No hernia.   Neurological: He is alert and oriented to person, place, and time. He exhibits normal muscle tone.   Skin: Skin is warm, intact and not diaphoretic.   Psychiatric: His speech is normal and behavior is normal. Mood, judgment and thought content normal.   Nursing note and vitals reviewed.         Previous History      Review of patient's allergies indicates:  No Known Allergies    Past Medical History:   Diagnosis Date    Hyperlipidemia      Current Outpatient Medications   Medication Instructions    aspirin (ECOTRIN) 81 MG EC tablet tablet    gabapentin (NEURONTIN) 600 mg, Oral    hyoscyamine (LEVSIN/SL) 0.125 mg, Sublingual, Every 6 hours PRN    nirmatrelvir-ritonavir 300 mg (150 mg x 2)-100 mg copackaged tablets (EUA) Take 3 tablets by mouth 2 (two) times daily. Each dose contains 2 nirmatrelvir (pink tablets) and 1 ritonavir (white tablet). Take all 3 tablets together    ondansetron (ZOFRAN-ODT) 4 mg, Oral, Every 6 hours PRN    rosuvastatin (CRESTOR) 10 MG tablet tablet     Past Surgical History:   Procedure Laterality Date    heart stents      WRIST SURGERY Bilateral      Family History   Problem Relation Age of Onset    Leukemia Father        Social History     Tobacco Use    Smoking status: Every Day     Current packs/day: 0.50     Average packs/day: 0.5 packs/day for 60.0 years (30.0 ttl pk-yrs)     Types: Cigarettes     Start date: 1/26/1964     Passive exposure: Never    Smokeless tobacco: Never   Substance Use Topics    Alcohol use: Yes     Comment: 1-2 times a week    Drug use: Never        Physical Exam      Vital Signs Reviewed   /67   Pulse 101   Temp 97.9 °F (36.6 °C)   Resp 18   Ht 6' 1" (1.854 " m)   Wt 76.7 kg (169 lb)   SpO2 97%   BMI 22.30 kg/m²        Procedures    Procedures     Labs     Results for orders placed or performed in visit on 04/03/23   Iron and TIBC   Result Value Ref Range    Iron Binding Capacity Unsaturated 326 (H) 69 - 240 ug/dL    Iron Level 29 (L) 65 - 175 ug/dL    Transferrin 331 163 - 344 mg/dL    Iron Binding Capacity Total 355 250 - 450 ug/dL    Iron Saturation 8 (L) 20 - 50 %   Ferritin   Result Value Ref Range    Ferritin Level 18.81 (L) 21.81 - 274.66 ng/mL   CBC with Differential   Result Value Ref Range    WBC 7.4 4.5 - 11.5 x10(3)/mcL    RBC 4.24 (L) 4.70 - 6.10 x10(6)/mcL    Hgb 9.4 (L) 14.0 - 18.0 g/dL    Hct 32.7 (L) 42.0 - 52.0 %    MCV 77.1 (L) 80.0 - 94.0 fL    MCH 22.2 (L) 27.0 - 31.0 pg    MCHC 28.7 (L) 33.0 - 36.0 g/dL    RDW      Platelet 421 (H) 130 - 400 x10(3)/mcL    MPV 9.6 7.4 - 10.4 fL    Neut % 58.0 %    Lymph % 26.2 %    Mono % 10.9 %    Eos % 2.8 %    Basophil % 1.8 %    Lymph # 1.94 0.6 - 4.6 x10(3)/mcL    Neut # 4.29 2.1 - 9.2 x10(3)/mcL    Mono # 0.81 0.1 - 1.3 x10(3)/mcL    Eos # 0.21 0 - 0.9 x10(3)/mcL    Baso # 0.13 0 - 0.2 x10(3)/mcL    IG# 0.02 0 - 0.04 x10(3)/mcL    IG% 0.3 %    NRBC% 0.0 %   Blood Smear Microscopic Exam   Result Value Ref Range    RBC Morph Abnormal (A) Normal    Anisocytosis 3+ (A) (none)    Hypochromasia 2+ (A) (none)    Microcytosis 1+ (A) (none)    Poikilocytosis 1+ (A) (none)    Polychromasia 1+ (A) (none)    Schistocytes 1+ (A) (none)    Tear Drops 1+ (A) (none)    Platelets Increased (A) Normal, Adequate       Assessment:     1. COVID    2. Cough, unspecified type    3. Diarrhea, unspecified type    4. Vomiting, unspecified vomiting type, unspecified whether nausea present        Plan:   COVID Positive  Start multi vitamin daily. Current CDC guidelines recommend that you quarantine for 5 days starting the day after your symptoms began. Quarantine can end after 5 days as long as the last 24 hours of quarantine you are  fever free and there is improvement of all your symptoms. Wear a mask around others for 5 additional days after quarantine. Treat your symptoms as you would the common cold. If you live with anybody, isolate yourself in a separate bedroom and use a separate bathroom. If your symptoms worsen or you develop shortness of breath or a fever over 103, seek medical attention immediately.   Hydrate with Pedialyte, Gatorade or powerade. When you decide to eat, keep your diet simple and bland. Bread, crackers, bananas, rice or broth for example. You can take immodium over the counter for non-bloody diarrhea. If there is blood in your diarrhea or vomit, seek medical attention immediately. If you have abdominal pain or worsening of your abdominal pain, seek medical attention immediately in the emergency department. If you have diarrhea and your diarrhea lasts for 4 consecutive days or longer, seek medical attention immediately, you will need stool studies at that time.     Patient was advised not to take his cholesterol statin while on Paxlovid  COVID    Cough, unspecified type  -     POCT COVID-19 Rapid Screening  -     POCT Influenza A/B Molecular    Diarrhea, unspecified type    Vomiting, unspecified vomiting type, unspecified whether nausea present    Other orders  -     nirmatrelvir-ritonavir 300 mg (150 mg x 2)-100 mg copackaged tablets (EUA); Take 3 tablets by mouth 2 (two) times daily. Each dose contains 2 nirmatrelvir (pink tablets) and 1 ritonavir (white tablet). Take all 3 tablets together  Dispense: 30 tablet; Refill: 0  -     hyoscyamine (LEVSIN/SL) 0.125 mg Subl; Place 1 tablet (0.125 mg total) under the tongue every 6 (six) hours as needed (belly cramps/diarrhea).  Dispense: 12 tablet; Refill: 0  -     ondansetron (ZOFRAN-ODT) 4 MG TbDL; Take 1 tablet (4 mg total) by mouth every 6 (six) hours as needed (n/v).  Dispense: 12 tablet; Refill: 0

## 2024-02-02 ENCOUNTER — OFFICE VISIT (OUTPATIENT)
Dept: URGENT CARE | Facility: CLINIC | Age: 74
End: 2024-02-02
Payer: COMMERCIAL

## 2024-02-02 VITALS
TEMPERATURE: 98 F | HEART RATE: 99 BPM | SYSTOLIC BLOOD PRESSURE: 105 MMHG | WEIGHT: 169.06 LBS | DIASTOLIC BLOOD PRESSURE: 66 MMHG | HEIGHT: 73 IN | RESPIRATION RATE: 18 BRPM | OXYGEN SATURATION: 99 % | BODY MASS INDEX: 22.4 KG/M2

## 2024-02-02 DIAGNOSIS — R53.83 FATIGUE, UNSPECIFIED TYPE: ICD-10-CM

## 2024-02-02 DIAGNOSIS — U07.1 COVID: Primary | ICD-10-CM

## 2024-02-02 PROCEDURE — 99213 OFFICE O/P EST LOW 20 MIN: CPT | Mod: ,,,

## 2024-02-02 NOTE — PATIENT INSTRUCTIONS
Smoking cessation strongly encouraged.  Assistance offered, information will be provided.  If not ready to quit now, patient will contact this clinic in the future if I can be of any specific health related to the discontinuation of smoking/tobacco use.

## 2024-02-02 NOTE — PROGRESS NOTES
"Subjective:      Patient ID: Randy Gutierrez is a 74 y.o. male.    Vitals:  height is 6' 1" (1.854 m) and weight is 76.7 kg (169 lb 1.5 oz). His oral temperature is 97.6 °F (36.4 °C). His blood pressure is 105/66 and his pulse is 99. His respiration is 18 and oxygen saturation is 99%.     Chief Complaint: Fatigue     Patient is a 74 y.o. male who presents to urgent care with complaints of general fatigue and loss of taste that are persisting since he was diagnosed with COVID 7 days ago.  Patient is at a total 7 day symptoms and reports overall his COVID symptoms have improved.  He no longer has nausea, vomiting, diarrhea, cough, fever.  All of the symptoms have resolved however he wanted to be tested for COVID see if he would still test positive and was still contagious.  Patient thought that because he took Paxlovid he would no longer test positive.  Patient reports he took Paxlovid for 5 days.  Denies fever, body aches, neck stiffness, rash, GI symptoms, cough, shortness breath, congestion, sinus pressure.    Fatigue  Associated symptoms include fatigue.       Constitution: Positive for fatigue.      Objective:     Physical Exam   Constitutional: He is oriented to person, place, and time. He appears well-developed. He is cooperative.  Non-toxic appearance. He does not appear ill. No distress.   HENT:   Head: Normocephalic and atraumatic.   Ears:   Right Ear: Hearing, tympanic membrane, external ear and ear canal normal.   Left Ear: Hearing, tympanic membrane, external ear and ear canal normal.   Nose: Nose normal. No mucosal edema, rhinorrhea or nasal deformity. No epistaxis. Right sinus exhibits no maxillary sinus tenderness and no frontal sinus tenderness. Left sinus exhibits no maxillary sinus tenderness and no frontal sinus tenderness.   Mouth/Throat: Uvula is midline, oropharynx is clear and moist and mucous membranes are normal. Mucous membranes are moist. No trismus in the jaw. Normal dentition. No uvula " swelling. No oropharyngeal exudate, posterior oropharyngeal edema or posterior oropharyngeal erythema.   Eyes: Conjunctivae and lids are normal. No scleral icterus.   Neck: Trachea normal and phonation normal. Neck supple. No edema present. No erythema present. No neck rigidity present.   Cardiovascular: Normal rate, regular rhythm, normal heart sounds and normal pulses.   Pulmonary/Chest: Effort normal and breath sounds normal. No respiratory distress. He has no decreased breath sounds. He has no wheezes. He has no rhonchi. He has no rales.   Abdominal: Normal appearance.   Musculoskeletal: Normal range of motion.         General: No deformity. Normal range of motion.   Neurological: He is alert and oriented to person, place, and time. He exhibits normal muscle tone. Coordination normal.   Skin: Skin is warm, dry, intact, not diaphoretic and not pale.   Psychiatric: His speech is normal and behavior is normal. Judgment and thought content normal.   Nursing note and vitals reviewed.      Assessment:     1. COVID    2. Fatigue, unspecified type        Plan:       COVID    Fatigue, unspecified type              Lengthy discussion with patient regarding COVID-19 virus, symptoms, contagion, Paxlovid therapy..  Patient instructed that as this is a virus he may have symptoms for weeks however there would be worry if he was still persisting with fever, cough or sinus pressure was worsening or he developed shortness of breath.  As none of the symptoms are present discussed no need for further testing or imaging in clinic at this time.  Patient instructed if fatigue persists for greater than a few week.  he is to follow with his primary care for further workup or blood work if needed.    Smoking cessation strongly encouraged.  Assistance offered, information will be provided.  If not ready to quit now, patient will contact this clinic in the future if I can be of any specific health related to the discontinuation of  smoking/tobacco use.

## 2024-02-29 ENCOUNTER — LAB VISIT (OUTPATIENT)
Dept: LAB | Facility: HOSPITAL | Age: 74
End: 2024-02-29
Attending: STUDENT IN AN ORGANIZED HEALTH CARE EDUCATION/TRAINING PROGRAM
Payer: COMMERCIAL

## 2024-02-29 DIAGNOSIS — D64.9 ANEMIA, UNSPECIFIED TYPE: Primary | ICD-10-CM

## 2024-02-29 DIAGNOSIS — K31.819 ARTERIOVENOUS MALFORMATION OF STOMACH: ICD-10-CM

## 2024-02-29 LAB
ALBUMIN SERPL-MCNC: 3.5 G/DL (ref 3.4–4.8)
ALBUMIN/GLOB SERPL: 1.1 RATIO (ref 1.1–2)
ALP SERPL-CCNC: 66 UNIT/L (ref 40–150)
ALT SERPL-CCNC: 11 UNIT/L (ref 0–55)
ANISOCYTOSIS BLD QL SMEAR: ABNORMAL
AST SERPL-CCNC: 19 UNIT/L (ref 5–34)
BASOPHILS # BLD AUTO: 0.14 X10(3)/MCL
BASOPHILS NFR BLD AUTO: 1.6 %
BILIRUB SERPL-MCNC: 0.5 MG/DL
BUN SERPL-MCNC: 15.4 MG/DL (ref 8.4–25.7)
CALCIUM SERPL-MCNC: 8.8 MG/DL (ref 8.8–10)
CHLORIDE SERPL-SCNC: 105 MMOL/L (ref 98–107)
CO2 SERPL-SCNC: 28 MMOL/L (ref 23–31)
CREAT SERPL-MCNC: 0.96 MG/DL (ref 0.73–1.18)
EOSINOPHIL # BLD AUTO: 0.61 X10(3)/MCL (ref 0–0.9)
EOSINOPHIL NFR BLD AUTO: 6.9 %
ERYTHROCYTE [DISTWIDTH] IN BLOOD BY AUTOMATED COUNT: 18.6 % (ref 11.5–17)
GFR SERPLBLD CREATININE-BSD FMLA CKD-EPI: >60 MLS/MIN/1.73/M2
GLOBULIN SER-MCNC: 3.1 GM/DL (ref 2.4–3.5)
GLUCOSE SERPL-MCNC: 87 MG/DL (ref 82–115)
HCT VFR BLD AUTO: 26 % (ref 42–52)
HGB BLD-MCNC: 7.2 G/DL (ref 14–18)
HYPOCHROMIA BLD QL SMEAR: ABNORMAL
IMM GRANULOCYTES # BLD AUTO: 0.04 X10(3)/MCL (ref 0–0.04)
IMM GRANULOCYTES NFR BLD AUTO: 0.5 %
IRON SATN MFR SERPL: 3 % (ref 20–50)
IRON SERPL-MCNC: 12 UG/DL (ref 65–175)
LYMPHOCYTES # BLD AUTO: 2 X10(3)/MCL (ref 0.6–4.6)
LYMPHOCYTES NFR BLD AUTO: 22.7 %
MCH RBC QN AUTO: 18.9 PG (ref 27–31)
MCHC RBC AUTO-ENTMCNC: 27.7 G/DL (ref 33–36)
MCV RBC AUTO: 68.4 FL (ref 80–94)
MICROCYTES BLD QL SMEAR: ABNORMAL
MONOCYTES # BLD AUTO: 1.08 X10(3)/MCL (ref 0.1–1.3)
MONOCYTES NFR BLD AUTO: 12.2 %
NEUTROPHILS # BLD AUTO: 4.96 X10(3)/MCL (ref 2.1–9.2)
NEUTROPHILS NFR BLD AUTO: 56.1 %
NRBC BLD AUTO-RTO: 0 %
PLATELET # BLD AUTO: 379 X10(3)/MCL (ref 130–400)
PLATELET # BLD EST: NORMAL 10*3/UL
PMV BLD AUTO: 9 FL (ref 7.4–10.4)
POIKILOCYTOSIS BLD QL SMEAR: ABNORMAL
POLYCHROMASIA BLD QL SMEAR: ABNORMAL
POTASSIUM SERPL-SCNC: 5 MMOL/L (ref 3.5–5.1)
PROT SERPL-MCNC: 6.6 GM/DL (ref 5.8–7.6)
RBC # BLD AUTO: 3.8 X10(6)/MCL (ref 4.7–6.1)
RBC MORPH BLD: ABNORMAL
SODIUM SERPL-SCNC: 139 MMOL/L (ref 136–145)
TARGETS BLD QL SMEAR: ABNORMAL
TIBC SERPL-MCNC: 401 UG/DL (ref 69–240)
TIBC SERPL-MCNC: 413 UG/DL (ref 250–450)
TRANSFERRIN SERPL-MCNC: 406 MG/DL (ref 163–344)
WBC # SPEC AUTO: 8.83 X10(3)/MCL (ref 4.5–11.5)

## 2024-02-29 PROCEDURE — 83540 ASSAY OF IRON: CPT

## 2024-02-29 PROCEDURE — 80053 COMPREHEN METABOLIC PANEL: CPT

## 2024-02-29 PROCEDURE — 85025 COMPLETE CBC W/AUTO DIFF WBC: CPT

## 2024-02-29 PROCEDURE — 36415 COLL VENOUS BLD VENIPUNCTURE: CPT

## 2024-03-04 ENCOUNTER — LAB VISIT (OUTPATIENT)
Dept: LAB | Facility: HOSPITAL | Age: 74
End: 2024-03-04
Attending: STUDENT IN AN ORGANIZED HEALTH CARE EDUCATION/TRAINING PROGRAM
Payer: COMMERCIAL

## 2024-03-04 DIAGNOSIS — D64.9 ANEMIA, UNSPECIFIED TYPE: Primary | ICD-10-CM

## 2024-03-04 DIAGNOSIS — D50.9 IRON DEFICIENCY ANEMIA, UNSPECIFIED: ICD-10-CM

## 2024-03-04 LAB
ABORH RETYPE: NORMAL
GROUP & RH: NORMAL
INDIRECT COOMBS: NORMAL
SPECIMEN OUTDATE: NORMAL

## 2024-03-04 PROCEDURE — 86901 BLOOD TYPING SEROLOGIC RH(D): CPT | Performed by: STUDENT IN AN ORGANIZED HEALTH CARE EDUCATION/TRAINING PROGRAM

## 2024-03-04 PROCEDURE — 86923 COMPATIBILITY TEST ELECTRIC: CPT | Performed by: STUDENT IN AN ORGANIZED HEALTH CARE EDUCATION/TRAINING PROGRAM

## 2024-03-04 PROCEDURE — 36415 COLL VENOUS BLD VENIPUNCTURE: CPT

## 2024-03-04 RX ORDER — HYDROCODONE BITARTRATE AND ACETAMINOPHEN 500; 5 MG/1; MG/1
TABLET ORAL ONCE
Status: CANCELLED | OUTPATIENT
Start: 2024-03-04 | End: 2024-03-04

## 2024-03-05 ENCOUNTER — INFUSION (OUTPATIENT)
Dept: INFUSION THERAPY | Facility: HOSPITAL | Age: 74
End: 2024-03-05
Attending: STUDENT IN AN ORGANIZED HEALTH CARE EDUCATION/TRAINING PROGRAM
Payer: COMMERCIAL

## 2024-03-05 VITALS
HEART RATE: 65 BPM | BODY MASS INDEX: 23.22 KG/M2 | OXYGEN SATURATION: 100 % | WEIGHT: 175.19 LBS | HEIGHT: 73 IN | SYSTOLIC BLOOD PRESSURE: 138 MMHG | DIASTOLIC BLOOD PRESSURE: 71 MMHG | TEMPERATURE: 98 F | RESPIRATION RATE: 16 BRPM

## 2024-03-05 DIAGNOSIS — D64.9 ANEMIA, UNSPECIFIED TYPE: ICD-10-CM

## 2024-03-05 LAB
ABO + RH BLD: NORMAL
ABO + RH BLD: NORMAL
BLD PROD TYP BPU: NORMAL
BLD PROD TYP BPU: NORMAL
BLOOD UNIT EXPIRATION DATE: NORMAL
BLOOD UNIT EXPIRATION DATE: NORMAL
BLOOD UNIT TYPE CODE: 6200
BLOOD UNIT TYPE CODE: 6200
CROSSMATCH INTERPRETATION: NORMAL
CROSSMATCH INTERPRETATION: NORMAL
DISPENSE STATUS: NORMAL
DISPENSE STATUS: NORMAL
UNIT NUMBER: NORMAL
UNIT NUMBER: NORMAL

## 2024-03-05 PROCEDURE — 25000003 PHARM REV CODE 250: Performed by: STUDENT IN AN ORGANIZED HEALTH CARE EDUCATION/TRAINING PROGRAM

## 2024-03-05 PROCEDURE — P9016 RBC LEUKOCYTES REDUCED: HCPCS | Performed by: STUDENT IN AN ORGANIZED HEALTH CARE EDUCATION/TRAINING PROGRAM

## 2024-03-05 PROCEDURE — 36430 TRANSFUSION BLD/BLD COMPNT: CPT

## 2024-03-05 RX ORDER — HYDROCODONE BITARTRATE AND ACETAMINOPHEN 500; 5 MG/1; MG/1
TABLET ORAL ONCE
Status: COMPLETED | OUTPATIENT
Start: 2024-03-05 | End: 2024-03-05

## 2024-03-05 RX ADMIN — SODIUM CHLORIDE: 9 INJECTION, SOLUTION INTRAVENOUS at 07:03

## 2025-04-23 ENCOUNTER — OFFICE VISIT (OUTPATIENT)
Dept: URGENT CARE | Facility: CLINIC | Age: 75
End: 2025-04-23
Payer: COMMERCIAL

## 2025-04-23 VITALS
BODY MASS INDEX: 21.87 KG/M2 | TEMPERATURE: 98 F | DIASTOLIC BLOOD PRESSURE: 65 MMHG | HEART RATE: 89 BPM | HEIGHT: 73 IN | RESPIRATION RATE: 17 BRPM | OXYGEN SATURATION: 99 % | SYSTOLIC BLOOD PRESSURE: 108 MMHG | WEIGHT: 165 LBS

## 2025-04-23 DIAGNOSIS — L03.114 CELLULITIS OF LEFT UPPER EXTREMITY: Primary | ICD-10-CM

## 2025-04-23 PROCEDURE — 99213 OFFICE O/P EST LOW 20 MIN: CPT | Mod: ,,, | Performed by: NURSE PRACTITIONER

## 2025-04-23 RX ORDER — CEPHALEXIN 500 MG/1
500 CAPSULE ORAL EVERY 8 HOURS
Qty: 21 CAPSULE | Refills: 0 | Status: SHIPPED | OUTPATIENT
Start: 2025-04-23 | End: 2025-04-30

## 2025-04-23 RX ORDER — MUPIROCIN 20 MG/G
OINTMENT TOPICAL 2 TIMES DAILY
Qty: 22 G | Refills: 0 | Status: SHIPPED | OUTPATIENT
Start: 2025-04-23 | End: 2025-04-30

## 2025-04-23 NOTE — PROGRESS NOTES
"Subjective:      Patient ID: Randy Gutierrez is a 75 y.o. male.    Vitals:  height is 6' 1" (1.854 m) and weight is 74.8 kg (165 lb). His temperature is 98 °F (36.7 °C). His blood pressure is 108/65 and his pulse is 89. His respiration is 17 and oxygen saturation is 99%.     Chief Complaint: No chief complaint on file.     Patient is a 75 y.o. male who presents to urgent care with complaints of  cut his arm on an[place] bush on left forearm near elbow with red ring around it since 3 days.  Patient denies fever.       Constitution: Negative for fatigue and fever.   HENT: Negative.  Negative for sinus pain and sore throat.    Cardiovascular: Negative.    Eyes: Negative.    Gastrointestinal:  Negative for nausea, vomiting and diarrhea.   Endocrine: negative.   Genitourinary:  Negative for dysuria, frequency, urgency and urine decreased.   Musculoskeletal:  Negative for muscle ache.   Skin:  Positive for abrasion and erythema.   Neurological: Negative.  Negative for headaches.      Objective:     Physical Exam   Constitutional: He is oriented to person, place, and time. He appears well-developed. He is cooperative.  Non-toxic appearance. He does not appear ill. No distress.   HENT:   Head: Normocephalic and atraumatic.   Ears:   Right Ear: Hearing, tympanic membrane, external ear and ear canal normal.   Left Ear: Hearing, tympanic membrane, external ear and ear canal normal.   Nose: Nose normal. No mucosal edema, rhinorrhea or nasal deformity. No epistaxis. Right sinus exhibits no maxillary sinus tenderness and no frontal sinus tenderness. Left sinus exhibits no maxillary sinus tenderness and no frontal sinus tenderness.   Mouth/Throat: Uvula is midline, oropharynx is clear and moist and mucous membranes are normal. No trismus in the jaw. Normal dentition. No uvula swelling. No oropharyngeal exudate, posterior oropharyngeal edema or posterior oropharyngeal erythema.   Eyes: Conjunctivae and lids are normal. No scleral " icterus.   Neck: Trachea normal and phonation normal. Neck supple. No edema present. No erythema present. No neck rigidity present.   Cardiovascular: Normal rate, regular rhythm, normal heart sounds and normal pulses.   Pulmonary/Chest: Effort normal and breath sounds normal. No respiratory distress. He has no decreased breath sounds. He has no rhonchi.   Abdominal: Normal appearance.   Musculoskeletal: Normal range of motion.         General: No deformity. Normal range of motion.      Right upper arm: He exhibits tenderness and swelling.        Arms:       Comments: Mild induration with surrounding erythema    Neurological: He is alert and oriented to person, place, and time. He exhibits normal muscle tone. Coordination normal.   Skin: Skin is warm, dry, intact, not diaphoretic and not pale. erythema   Psychiatric: His speech is normal and behavior is normal. Judgment and thought content normal.   Nursing note and vitals reviewed.      Assessment:     1. Cellulitis of left upper extremity        Plan:       Cellulitis of left upper extremity    Other orders  -     cephALEXin (KEFLEX) 500 MG capsule; Take 1 capsule (500 mg total) by mouth every 8 (eight) hours. for 7 days  Dispense: 21 capsule; Refill: 0  -     mupirocin (BACTROBAN) 2 % ointment; Apply topically 2 (two) times daily. for 7 days  Dispense: 22 g; Refill: 0